# Patient Record
Sex: FEMALE | Race: BLACK OR AFRICAN AMERICAN | Employment: FULL TIME | URBAN - METROPOLITAN AREA
[De-identification: names, ages, dates, MRNs, and addresses within clinical notes are randomized per-mention and may not be internally consistent; named-entity substitution may affect disease eponyms.]

---

## 2017-07-31 PROBLEM — M54.32 BACK PAIN WITH LEFT-SIDED SCIATICA: Status: ACTIVE | Noted: 2017-07-31

## 2017-07-31 PROBLEM — E11.42 TYPE 2 DIABETES MELLITUS WITH DIABETIC POLYNEUROPATHY, WITHOUT LONG-TERM CURRENT USE OF INSULIN (HCC): Status: ACTIVE | Noted: 2017-07-31

## 2017-07-31 PROBLEM — E13.319 RETINOPATHY DUE TO SECONDARY DIABETES MELLITUS (HCC): Status: ACTIVE | Noted: 2017-07-31

## 2017-07-31 PROBLEM — Z87.898 HISTORY OF PALPITATIONS: Status: ACTIVE | Noted: 2017-07-31

## 2018-05-16 ENCOUNTER — HOSPITAL ENCOUNTER (OUTPATIENT)
Dept: GENERAL RADIOLOGY | Age: 43
Discharge: HOME | End: 2018-05-18
Payer: COMMERCIAL

## 2018-05-16 ENCOUNTER — TELEPHONE (OUTPATIENT)
Dept: INTERNAL MEDICINE | Age: 43
End: 2018-05-16

## 2018-05-16 DIAGNOSIS — N64.4 BILATERAL MASTODYNIA: ICD-10-CM

## 2018-05-16 PROCEDURE — G0279 TOMOSYNTHESIS, MAMMO: HCPCS

## 2018-06-05 ENCOUNTER — OFFICE VISIT (OUTPATIENT)
Dept: INTERNAL MEDICINE | Age: 43
End: 2018-06-05
Payer: COMMERCIAL

## 2018-06-05 VITALS
WEIGHT: 140.6 LBS | BODY MASS INDEX: 27.61 KG/M2 | RESPIRATION RATE: 16 BRPM | HEART RATE: 77 BPM | TEMPERATURE: 98.4 F | SYSTOLIC BLOOD PRESSURE: 129 MMHG | DIASTOLIC BLOOD PRESSURE: 76 MMHG | HEIGHT: 60 IN

## 2018-06-05 DIAGNOSIS — E78.5 HYPERLIPIDEMIA, UNSPECIFIED HYPERLIPIDEMIA TYPE: ICD-10-CM

## 2018-06-05 DIAGNOSIS — E11.42 TYPE 2 DIABETES MELLITUS WITH DIABETIC POLYNEUROPATHY, WITHOUT LONG-TERM CURRENT USE OF INSULIN (HCC): ICD-10-CM

## 2018-06-05 DIAGNOSIS — Z79.4 TYPE 2 DIABETES MELLITUS WITHOUT COMPLICATION, WITH LONG-TERM CURRENT USE OF INSULIN (HCC): ICD-10-CM

## 2018-06-05 DIAGNOSIS — E11.9 TYPE 2 DIABETES MELLITUS WITHOUT COMPLICATION, WITH LONG-TERM CURRENT USE OF INSULIN (HCC): ICD-10-CM

## 2018-06-05 LAB — HBA1C MFR BLD: 8.9 %

## 2018-06-05 PROCEDURE — 99212 OFFICE O/P EST SF 10 MIN: CPT | Performed by: INTERNAL MEDICINE

## 2018-06-05 PROCEDURE — 99213 OFFICE O/P EST LOW 20 MIN: CPT | Performed by: INTERNAL MEDICINE

## 2018-06-05 PROCEDURE — 83036 HEMOGLOBIN GLYCOSYLATED A1C: CPT | Performed by: INTERNAL MEDICINE

## 2018-06-05 RX ORDER — LANCETS 30 GAUGE
EACH MISCELLANEOUS
Qty: 180 EACH | Refills: 1 | Status: SHIPPED | OUTPATIENT
Start: 2018-06-05 | End: 2018-08-14 | Stop reason: SDUPTHER

## 2018-06-05 RX ORDER — GLUCOSAMINE HCL/CHONDROITIN SU 500-400 MG
CAPSULE ORAL
Qty: 180 STRIP | Refills: 1 | Status: SHIPPED | OUTPATIENT
Start: 2018-06-05 | End: 2018-08-14 | Stop reason: SDUPTHER

## 2018-06-05 RX ORDER — PRAVASTATIN SODIUM 40 MG
40 TABLET ORAL DAILY
Qty: 90 TABLET | Refills: 0 | Status: SHIPPED | OUTPATIENT
Start: 2018-06-05 | End: 2018-08-14 | Stop reason: SDUPTHER

## 2018-06-05 ASSESSMENT — ENCOUNTER SYMPTOMS
WHEEZING: 0
VOMITING: 0
SHORTNESS OF BREATH: 0
COUGH: 0
BLURRED VISION: 0
ABDOMINAL PAIN: 0
DIARRHEA: 0
HEARTBURN: 0
EYE DISCHARGE: 0
NAUSEA: 0
HEMOPTYSIS: 0
SORE THROAT: 0
CONSTIPATION: 0

## 2018-08-13 ENCOUNTER — TELEPHONE (OUTPATIENT)
Dept: INTERNAL MEDICINE | Age: 43
End: 2018-08-13

## 2018-08-14 ENCOUNTER — OFFICE VISIT (OUTPATIENT)
Dept: INTERNAL MEDICINE | Age: 43
End: 2018-08-14
Payer: COMMERCIAL

## 2018-08-14 ENCOUNTER — HOSPITAL ENCOUNTER (OUTPATIENT)
Age: 43
Discharge: HOME | End: 2018-08-16
Payer: COMMERCIAL

## 2018-08-14 VITALS
WEIGHT: 141.5 LBS | DIASTOLIC BLOOD PRESSURE: 81 MMHG | BODY MASS INDEX: 27.78 KG/M2 | HEART RATE: 78 BPM | TEMPERATURE: 98.3 F | RESPIRATION RATE: 18 BRPM | SYSTOLIC BLOOD PRESSURE: 122 MMHG | HEIGHT: 60 IN

## 2018-08-14 DIAGNOSIS — E11.9 TYPE 2 DIABETES MELLITUS WITHOUT COMPLICATION, WITH LONG-TERM CURRENT USE OF INSULIN (HCC): ICD-10-CM

## 2018-08-14 DIAGNOSIS — E78.5 HYPERLIPIDEMIA, UNSPECIFIED HYPERLIPIDEMIA TYPE: ICD-10-CM

## 2018-08-14 DIAGNOSIS — Z79.4 TYPE 2 DIABETES MELLITUS WITHOUT COMPLICATION, WITH LONG-TERM CURRENT USE OF INSULIN (HCC): ICD-10-CM

## 2018-08-14 DIAGNOSIS — E11.42 TYPE 2 DIABETES MELLITUS WITH DIABETIC POLYNEUROPATHY, WITHOUT LONG-TERM CURRENT USE OF INSULIN (HCC): Primary | ICD-10-CM

## 2018-08-14 DIAGNOSIS — M54.32 BACK PAIN WITH LEFT-SIDED SCIATICA: ICD-10-CM

## 2018-08-14 LAB
CHOLESTEROL, TOTAL: 155 MG/DL (ref 0–199)
CREATININE URINE: 130 MG/DL (ref 29–226)
HDLC SERPL-MCNC: 23 MG/DL
LDL CHOLESTEROL CALCULATED: ABNORMAL MG/DL (ref 0–99)
MICROALBUMIN UR-MCNC: 40 MG/L
MICROALBUMIN/CREAT UR-RTO: 30.8 (ref 0–30)
TRIGL SERPL-MCNC: 622 MG/DL (ref 0–149)
VLDLC SERPL CALC-MCNC: ABNORMAL MG/DL

## 2018-08-14 PROCEDURE — 99214 OFFICE O/P EST MOD 30 MIN: CPT | Performed by: INTERNAL MEDICINE

## 2018-08-14 PROCEDURE — 82570 ASSAY OF URINE CREATININE: CPT

## 2018-08-14 PROCEDURE — 99212 OFFICE O/P EST SF 10 MIN: CPT | Performed by: INTERNAL MEDICINE

## 2018-08-14 PROCEDURE — 82044 UR ALBUMIN SEMIQUANTITATIVE: CPT

## 2018-08-14 PROCEDURE — 36415 COLL VENOUS BLD VENIPUNCTURE: CPT | Performed by: INTERNAL MEDICINE

## 2018-08-14 PROCEDURE — 80061 LIPID PANEL: CPT

## 2018-08-14 RX ORDER — PRAVASTATIN SODIUM 40 MG
40 TABLET ORAL DAILY
Qty: 90 TABLET | Refills: 1 | Status: SHIPPED | OUTPATIENT
Start: 2018-08-14 | End: 2018-12-19 | Stop reason: SDUPTHER

## 2018-08-14 RX ORDER — GLUCOSAMINE HCL/CHONDROITIN SU 500-400 MG
CAPSULE ORAL
Qty: 180 STRIP | Refills: 5 | Status: SHIPPED | OUTPATIENT
Start: 2018-08-14 | End: 2018-12-19 | Stop reason: SDUPTHER

## 2018-08-14 RX ORDER — LANCETS 30 GAUGE
EACH MISCELLANEOUS
Qty: 200 EACH | Refills: 3 | Status: CANCELLED | OUTPATIENT
Start: 2018-08-14

## 2018-08-14 RX ORDER — LANCETS 30 GAUGE
EACH MISCELLANEOUS
Qty: 180 EACH | Refills: 5 | Status: SHIPPED | OUTPATIENT
Start: 2018-08-14 | End: 2018-12-19 | Stop reason: SDUPTHER

## 2018-08-14 RX ORDER — NAPROXEN 375 MG/1
375 TABLET ORAL 2 TIMES DAILY WITH MEALS
Qty: 30 TABLET | Refills: 1 | Status: SHIPPED | OUTPATIENT
Start: 2018-08-14 | End: 2018-12-19 | Stop reason: SDUPTHER

## 2018-08-14 ASSESSMENT — ENCOUNTER SYMPTOMS: SHORTNESS OF BREATH: 0

## 2018-08-14 NOTE — PROGRESS NOTES
Pt information obtained with assistance from  phone- Rafael Fisher, [phone]. Lab work done. Discharge instructions reviewed with patient per Dr. Kishan Fan with assistance from Dominic Bridges, [address] [facility] . AVS given to patient.

## 2018-08-14 NOTE — PATIENT INSTRUCTIONS
We tested your blood for cholesterol and kidney function. Please check your sugar before each meal.    Please be compliant with your insulin.

## 2018-08-17 NOTE — PROGRESS NOTES
Met with pt as part of  [facility] and interpretation from [employer] phone # [phone]. Pt is known to LSW; discussed importance of adherence to diabetes regiment. She does keep blood sugar log and applauded for those efforts. Pt states she struggles financially with testing supplies; reminded of being on jeffery program and LSW provided renewal of contract to bring to pharmacy; Dr. Dilcia Leslie signed out samples of Humalog and Lantus and pt verifies she can afford po medications. Attempted to review her insuracne coverage, but she states she doesn't really know what is covered; advised of financial assistance process and need to submit proof of income with application which she verbalized understanding.

## 2018-09-12 ENCOUNTER — OFFICE VISIT (OUTPATIENT)
Dept: INTERNAL MEDICINE | Age: 43
End: 2018-09-12
Payer: COMMERCIAL

## 2018-09-12 VITALS
WEIGHT: 140.3 LBS | HEIGHT: 60 IN | DIASTOLIC BLOOD PRESSURE: 75 MMHG | TEMPERATURE: 97.8 F | SYSTOLIC BLOOD PRESSURE: 127 MMHG | HEART RATE: 66 BPM | BODY MASS INDEX: 27.55 KG/M2

## 2018-09-12 DIAGNOSIS — E11.42 TYPE 2 DIABETES MELLITUS WITH DIABETIC POLYNEUROPATHY, WITHOUT LONG-TERM CURRENT USE OF INSULIN (HCC): Primary | ICD-10-CM

## 2018-09-12 DIAGNOSIS — B35.1 ONYCHOMYCOSIS: ICD-10-CM

## 2018-09-12 PROCEDURE — 99214 OFFICE O/P EST MOD 30 MIN: CPT | Performed by: INTERNAL MEDICINE

## 2018-09-12 PROCEDURE — 99212 OFFICE O/P EST SF 10 MIN: CPT | Performed by: INTERNAL MEDICINE

## 2018-09-12 RX ORDER — TERBINAFINE HYDROCHLORIDE 250 MG/1
250 TABLET ORAL DAILY
Qty: 84 TABLET | Refills: 0 | Status: SHIPPED | OUTPATIENT
Start: 2018-09-12 | End: 2018-12-19 | Stop reason: SDUPTHER

## 2018-09-12 ASSESSMENT — PATIENT HEALTH QUESTIONNAIRE - PHQ9
2. FEELING DOWN, DEPRESSED OR HOPELESS: 0
SUM OF ALL RESPONSES TO PHQ QUESTIONS 1-9: 0
SUM OF ALL RESPONSES TO PHQ9 QUESTIONS 1 & 2: 0
1. LITTLE INTEREST OR PLEASURE IN DOING THINGS: 0
SUM OF ALL RESPONSES TO PHQ QUESTIONS 1-9: 0

## 2018-09-12 ASSESSMENT — ENCOUNTER SYMPTOMS
GASTROINTESTINAL NEGATIVE: 1
EYES NEGATIVE: 1

## 2018-09-12 NOTE — PROGRESS NOTES
Chief Complaint   Patient presents with    Diabetes     brought BS log in with her. History of Present Illness:  40 yo female with history of DM2 presents for follow-up. States she has been taking Lantus 30 units and Humalog 15 units prior to supper. She reports feeling tired when hyperglycemic and headaches when hypoglycemic. Review of Systems   Constitutional: Negative. HENT: Negative. Eyes: Negative. Cardiovascular: Negative. Gastrointestinal: Negative. Genitourinary: Negative. Musculoskeletal: Negative. Skin:        Right great toe fungal infection   Neurological: Negative. Past Medical History:   Diagnosis Date    History of palpitations 7/31/2017     No past surgical history on file. No family history on file. Social History     Social History    Marital status:      Spouse name: N/A    Number of children: N/A    Years of education: N/A     Occupational History    Not on file.      Social History Main Topics    Smoking status: Never Smoker    Smokeless tobacco: Never Used    Alcohol use No    Drug use: No    Sexual activity: Yes     Partners: Male     Other Topics Concern    Not on file     Social History Narrative    No narrative on file     No Known Allergies  Current Outpatient Prescriptions on File Prior to Visit   Medication Sig Dispense Refill    metFORMIN (GLUCOPHAGE) 500 MG tablet Take 1 tablet by mouth 2 times daily (with meals) 180 tablet 1    pravastatin (PRAVACHOL) 40 MG tablet Take 1 tablet by mouth daily 90 tablet 1    insulin glargine (LANTUS SOLOSTAR) 100 UNIT/ML injection pen Inject 30 Units into the skin daily 5 pen 5    Insulin Pen Needle (B-D ULTRAFINE III SHORT PEN) 31G X 8 MM MISC 1 each by Does not apply route 2 times daily 200 each 5    Lancets MISC Agamatrix presto/tests twice daily 180 each 5    blood glucose monitor strips Agamatrix presto/tests twice daily 180 strip 5    insulin lispro (HUMALOG KWIKPEN) 100

## 2018-09-12 NOTE — PATIENT INSTRUCTIONS
Patient Education        Prevención de caídas: Instrucciones de cuidado - [ Preventing Falls: Care Instructions ]  Instrucciones de cuidado    Caminar por cordova casa de manera ly puede convertirse en un desafío, sobre todo si tiene lesiones o problemas de garry que puedan hacer que se caiga con [place]. Las alfombras sueltas y los muebles en los pasillos se encuentran entre los peligros que enfrentan muchas personas mayores que tienen problemas para caminar o de la visión. Las [employer] tienen afecciones peter artritis, osteoporosis o demencia, también deben tener cuidado de no caerse. Usted puede hacer que cordova hogar sea más seguro si christiano algunas medidas sencillas. La atención de seguimiento es stanton parte clave de cordova tratamiento y seguridad. Asegúrese de hacer y acudir a todas las citas, y llame a cordova médico si está teniendo problemas. También es stanton buena idea saber los resultados de haritha exámenes y mantener stanton lista de los medicamentos que christiano. ¿Cómo puede cuidarse en el hogar? Cómo cuidarse  · Podría marearse si no kobe suficiente agua. Para prevenir la deshidratación, rosa abundantes líquidos, los suficientes para que cordova orina sea de color amarillo tami o transparente peter el agua. Elija roselyn agua y otros líquidos preeti sin cafeína. Si tiene stanton enfermedad del riñón, del corazón o del hígado y tiene que Maurice's líquidos, hable con crodova médico antes de aumentar cordova consumo. · Isma ejercicio con regularidad para mejorar cordova fortaleza, cordova srinivas muscular y cordova estabilidad. Camine, si puede. Nadar podría ser [facility] buena alternativa si le marry trabajo caminar. · Hágase un examen de la visión y la audición cada año o cada vez que note un cambio. Si tiene dificultades para oswaldo y oír, es posible que no pueda evitar objetos y podría perder cordova equilibrio. · Conozca los efectos secundarios de los medicamentos que christiano.  Pregúntele a cordova médico o cordova farmacéutico si los medicamentos que christiano pueden afectar cordova equilibrio. Las pastillas para dormir o los sedantes pueden afectar cordova equilibrio. · Limite la cantidad de alcohol que kobe. El alcohol puede alterar cordova equilibrio y otros sentidos. · Pregúntele a cordova médico si los callos o las callosidades deben ser eliminados de haritha pies. Si [place] un calzado holgado a causa de los callos o las callosidades, podría perder el equilibrio y caerse. · Hable con cordova médico si tiene entumecimiento en los pies. [address] [place] en el hogar  · Quite escalones en la francisco javier de entrada, alfombrillas y obstáculos. Fije las alfombras sueltas o repare las áreas levantadas del piso. · Mueva los muebles y los cables eléctricos para que no estén en los pasillos. · Utilice cera antideslizante para pisos, y seque de inmediato cualquier derrame que se produzca, sobre todo si el piso es de baldosas de cerámica. · Si [place] stanton andadera o un bastón, colóquele un revestimiento de goma en las puntas. Si utiliza muletas, limpie la base regularmente con un paño abrasivo, peter un estropajo de berenice. · Mantenga la casa jumana isabelle, sobre todo las [place], los porches y los pasillos exteriores. Utilice lamparitas nocturnas en áreas peter vestíbulos y renard. Ponga interruptores de andres adicionales o utilice interruptores a distancia (peter los que se encienden o apagan al aplaudir) para que sea más fácil encender las luces si tiene que levantarse por las noches. · Instale pasamanos o barandillas sólidos en las escaleras. · Ponga los artículos que más utiliza en los estantes bajos de los gabinetes (aproximadamente a la altura de cordova cintura). · Tenga un teléfono [facility] linterna con baterías nuevas cerca de cordova cama. Si es posible, coloque un teléfono en cada stanton de las habitaciones de cordova casa, o lleve siempre un celular en ant de que se caiga y no pueda llegar al teléfono.  O puede usar un dispositivo en el rodrigue o la brandon en el que presione un botón para enviar stanton señal pidiendo

## 2018-12-19 ENCOUNTER — HOSPITAL ENCOUNTER (OUTPATIENT)
Age: 43
Discharge: HOME | End: 2018-12-19
Payer: COMMERCIAL

## 2018-12-19 ENCOUNTER — OFFICE VISIT (OUTPATIENT)
Dept: INTERNAL MEDICINE | Age: 43
End: 2018-12-19
Payer: COMMERCIAL

## 2018-12-19 VITALS
DIASTOLIC BLOOD PRESSURE: 72 MMHG | RESPIRATION RATE: 14 BRPM | HEIGHT: 60 IN | TEMPERATURE: 98 F | SYSTOLIC BLOOD PRESSURE: 125 MMHG | HEART RATE: 69 BPM | WEIGHT: 137 LBS | BODY MASS INDEX: 26.9 KG/M2

## 2018-12-19 DIAGNOSIS — E78.5 HYPERLIPIDEMIA, UNSPECIFIED HYPERLIPIDEMIA TYPE: ICD-10-CM

## 2018-12-19 DIAGNOSIS — E11.42 TYPE 2 DIABETES MELLITUS WITH DIABETIC POLYNEUROPATHY, WITHOUT LONG-TERM CURRENT USE OF INSULIN (HCC): ICD-10-CM

## 2018-12-19 DIAGNOSIS — B35.1 ONYCHOMYCOSIS OF GREAT TOE: ICD-10-CM

## 2018-12-19 DIAGNOSIS — M54.32 BACK PAIN WITH LEFT-SIDED SCIATICA: ICD-10-CM

## 2018-12-19 DIAGNOSIS — B35.1 ONYCHOMYCOSIS: ICD-10-CM

## 2018-12-19 DIAGNOSIS — E11.9 TYPE 2 DIABETES MELLITUS WITHOUT COMPLICATION, WITH LONG-TERM CURRENT USE OF INSULIN (HCC): ICD-10-CM

## 2018-12-19 DIAGNOSIS — Z79.4 TYPE 2 DIABETES MELLITUS WITHOUT COMPLICATION, WITH LONG-TERM CURRENT USE OF INSULIN (HCC): ICD-10-CM

## 2018-12-19 LAB
ALBUMIN SERPL-MCNC: 4 G/DL (ref 3.5–5.2)
ALP BLD-CCNC: 89 U/L (ref 35–104)
ALT SERPL-CCNC: 19 U/L (ref 0–32)
ANION GAP SERPL CALCULATED.3IONS-SCNC: 13 MMOL/L (ref 7–16)
AST SERPL-CCNC: 20 U/L (ref 0–31)
BILIRUB SERPL-MCNC: 0.3 MG/DL (ref 0–1.2)
BUN BLDV-MCNC: 8 MG/DL (ref 6–20)
CALCIUM SERPL-MCNC: 9 MG/DL (ref 8.6–10.2)
CHLORIDE BLD-SCNC: 101 MMOL/L (ref 98–107)
CHOLESTEROL, TOTAL: 153 MG/DL (ref 0–199)
CO2: 26 MMOL/L (ref 22–29)
CREAT SERPL-MCNC: 0.5 MG/DL (ref 0.5–1)
GFR AFRICAN AMERICAN: >60
GFR NON-AFRICAN AMERICAN: >60 ML/MIN/1.73
GLUCOSE BLD-MCNC: 162 MG/DL (ref 74–99)
HDLC SERPL-MCNC: 42 MG/DL
LDL CHOLESTEROL CALCULATED: 71 MG/DL (ref 0–99)
POTASSIUM SERPL-SCNC: 4.1 MMOL/L (ref 3.5–5)
SODIUM BLD-SCNC: 140 MMOL/L (ref 132–146)
TOTAL PROTEIN: 7.8 G/DL (ref 6.4–8.3)
TRIGL SERPL-MCNC: 200 MG/DL (ref 0–149)
VLDLC SERPL CALC-MCNC: 40 MG/DL

## 2018-12-19 PROCEDURE — 36415 COLL VENOUS BLD VENIPUNCTURE: CPT

## 2018-12-19 PROCEDURE — 80061 LIPID PANEL: CPT

## 2018-12-19 PROCEDURE — 99212 OFFICE O/P EST SF 10 MIN: CPT | Performed by: INTERNAL MEDICINE

## 2018-12-19 PROCEDURE — 99214 OFFICE O/P EST MOD 30 MIN: CPT | Performed by: INTERNAL MEDICINE

## 2018-12-19 PROCEDURE — 80053 COMPREHEN METABOLIC PANEL: CPT

## 2018-12-19 RX ORDER — GLUCOSAMINE HCL/CHONDROITIN SU 500-400 MG
CAPSULE ORAL
Qty: 180 STRIP | Refills: 1 | Status: SHIPPED | OUTPATIENT
Start: 2018-12-19 | End: 2019-02-20 | Stop reason: SDUPTHER

## 2018-12-19 RX ORDER — TERBINAFINE HYDROCHLORIDE 250 MG/1
250 TABLET ORAL DAILY
Qty: 84 TABLET | Refills: 0 | Status: SHIPPED | OUTPATIENT
Start: 2018-12-19 | End: 2019-03-13

## 2018-12-19 RX ORDER — PRAVASTATIN SODIUM 40 MG
40 TABLET ORAL DAILY
Qty: 30 TABLET | Refills: 3 | Status: SHIPPED | OUTPATIENT
Start: 2018-12-19 | End: 2019-02-20 | Stop reason: SDUPTHER

## 2018-12-19 RX ORDER — LANCETS 30 GAUGE
EACH MISCELLANEOUS
Qty: 180 EACH | Refills: 1 | Status: SHIPPED | OUTPATIENT
Start: 2018-12-19 | End: 2019-02-20 | Stop reason: SDUPTHER

## 2018-12-19 RX ORDER — NAPROXEN 375 MG/1
375 TABLET ORAL 2 TIMES DAILY PRN
Qty: 30 TABLET | Refills: 3 | Status: SHIPPED | OUTPATIENT
Start: 2018-12-19 | End: 2019-02-20 | Stop reason: SDUPTHER

## 2018-12-19 NOTE — PATIENT INSTRUCTIONS
Continue to monitor BS levels twice daily  MEDICATION CHANGES:  LANTUS increased to 40 units daily  LISPRO increased 12 units daily    Stick to low carbohydrate diet   Do labs prior to next visit:  CMP and lipid panel

## 2019-02-20 ENCOUNTER — OFFICE VISIT (OUTPATIENT)
Dept: INTERNAL MEDICINE | Age: 44
End: 2019-02-20
Payer: COMMERCIAL

## 2019-02-20 VITALS
SYSTOLIC BLOOD PRESSURE: 137 MMHG | HEART RATE: 78 BPM | BODY MASS INDEX: 28.07 KG/M2 | RESPIRATION RATE: 16 BRPM | DIASTOLIC BLOOD PRESSURE: 82 MMHG | HEIGHT: 60 IN | TEMPERATURE: 98 F | WEIGHT: 143 LBS

## 2019-02-20 DIAGNOSIS — Z79.4 TYPE 2 DIABETES MELLITUS WITHOUT COMPLICATION, WITH LONG-TERM CURRENT USE OF INSULIN (HCC): Primary | ICD-10-CM

## 2019-02-20 DIAGNOSIS — M54.32 BACK PAIN WITH LEFT-SIDED SCIATICA: ICD-10-CM

## 2019-02-20 DIAGNOSIS — M75.41 IMPINGEMENT SYNDROME OF RIGHT SHOULDER: ICD-10-CM

## 2019-02-20 DIAGNOSIS — E78.5 HYPERLIPIDEMIA, UNSPECIFIED HYPERLIPIDEMIA TYPE: ICD-10-CM

## 2019-02-20 DIAGNOSIS — E11.42 TYPE 2 DIABETES MELLITUS WITH DIABETIC POLYNEUROPATHY, WITHOUT LONG-TERM CURRENT USE OF INSULIN (HCC): ICD-10-CM

## 2019-02-20 DIAGNOSIS — Z00.00 HEALTHCARE MAINTENANCE: ICD-10-CM

## 2019-02-20 DIAGNOSIS — E11.9 TYPE 2 DIABETES MELLITUS WITHOUT COMPLICATION, WITH LONG-TERM CURRENT USE OF INSULIN (HCC): Primary | ICD-10-CM

## 2019-02-20 DIAGNOSIS — Z23 NEED FOR INFLUENZA VACCINATION: ICD-10-CM

## 2019-02-20 PROCEDURE — 99212 OFFICE O/P EST SF 10 MIN: CPT | Performed by: INTERNAL MEDICINE

## 2019-02-20 PROCEDURE — 99214 OFFICE O/P EST MOD 30 MIN: CPT | Performed by: INTERNAL MEDICINE

## 2019-02-20 RX ORDER — NAPROXEN 500 MG/1
500 TABLET ORAL 2 TIMES DAILY PRN
Qty: 60 TABLET | Refills: 2 | Status: SHIPPED | OUTPATIENT
Start: 2019-02-20 | End: 2019-11-22 | Stop reason: SINTOL

## 2019-02-20 RX ORDER — LANCETS 30 GAUGE
EACH MISCELLANEOUS
Qty: 180 EACH | Refills: 1 | Status: SHIPPED | OUTPATIENT
Start: 2019-02-20 | End: 2019-02-20 | Stop reason: SDUPTHER

## 2019-02-20 RX ORDER — GLUCOSAMINE HCL/CHONDROITIN SU 500-400 MG
CAPSULE ORAL
Qty: 180 STRIP | Refills: 5 | Status: SHIPPED | OUTPATIENT
Start: 2019-02-20 | End: 2019-06-06 | Stop reason: SDUPTHER

## 2019-02-20 RX ORDER — PRAVASTATIN SODIUM 40 MG
40 TABLET ORAL DAILY
Qty: 30 TABLET | Refills: 2 | Status: SHIPPED | OUTPATIENT
Start: 2019-02-20 | End: 2019-06-06 | Stop reason: SDUPTHER

## 2019-02-20 RX ORDER — LANCETS 30 GAUGE
EACH MISCELLANEOUS
Qty: 180 EACH | Refills: 1 | Status: SHIPPED | OUTPATIENT
Start: 2019-02-20 | End: 2019-06-06 | Stop reason: SDUPTHER

## 2019-02-20 RX ORDER — NAPROXEN 500 MG/1
500 TABLET ORAL 2 TIMES DAILY PRN
Qty: 60 TABLET | Refills: 1 | Status: SHIPPED | OUTPATIENT
Start: 2019-02-20 | End: 2019-02-20 | Stop reason: SDUPTHER

## 2019-02-20 ASSESSMENT — ENCOUNTER SYMPTOMS
SORE THROAT: 0
VOMITING: 0
CONSTIPATION: 0
COUGH: 0
EYE DISCHARGE: 0
SHORTNESS OF BREATH: 0
ABDOMINAL PAIN: 0
DIARRHEA: 0
WHEEZING: 0
NAUSEA: 0

## 2019-02-20 ASSESSMENT — PATIENT HEALTH QUESTIONNAIRE - PHQ9
SUM OF ALL RESPONSES TO PHQ QUESTIONS 1-9: 0
1. LITTLE INTEREST OR PLEASURE IN DOING THINGS: 0
2. FEELING DOWN, DEPRESSED OR HOPELESS: 0
SUM OF ALL RESPONSES TO PHQ9 QUESTIONS 1 & 2: 0
SUM OF ALL RESPONSES TO PHQ QUESTIONS 1-9: 0

## 2019-06-06 ENCOUNTER — OFFICE VISIT (OUTPATIENT)
Dept: INTERNAL MEDICINE | Age: 44
End: 2019-06-06
Payer: COMMERCIAL

## 2019-06-06 ENCOUNTER — HOSPITAL ENCOUNTER (OUTPATIENT)
Age: 44
Discharge: HOME | End: 2019-06-06
Payer: COMMERCIAL

## 2019-06-06 VITALS
RESPIRATION RATE: 16 BRPM | BODY MASS INDEX: 28.19 KG/M2 | HEIGHT: 60 IN | WEIGHT: 143.6 LBS | HEART RATE: 81 BPM | DIASTOLIC BLOOD PRESSURE: 72 MMHG | TEMPERATURE: 98.9 F | SYSTOLIC BLOOD PRESSURE: 115 MMHG

## 2019-06-06 DIAGNOSIS — Z11.4 SCREENING FOR HIV (HUMAN IMMUNODEFICIENCY VIRUS): ICD-10-CM

## 2019-06-06 DIAGNOSIS — E78.5 HYPERLIPIDEMIA, UNSPECIFIED HYPERLIPIDEMIA TYPE: ICD-10-CM

## 2019-06-06 DIAGNOSIS — E11.9 TYPE 2 DIABETES MELLITUS WITHOUT COMPLICATION, WITH LONG-TERM CURRENT USE OF INSULIN (HCC): ICD-10-CM

## 2019-06-06 DIAGNOSIS — R10.30 LOWER ABDOMINAL PAIN: Primary | ICD-10-CM

## 2019-06-06 DIAGNOSIS — Z79.4 TYPE 2 DIABETES MELLITUS WITHOUT COMPLICATION, WITH LONG-TERM CURRENT USE OF INSULIN (HCC): ICD-10-CM

## 2019-06-06 LAB
BILIRUBIN, POC: ABNORMAL
BLOOD URINE, POC: ABNORMAL
CLARITY, POC: CLEAR
COLOR, POC: YELLOW
GLUCOSE URINE, POC: ABNORMAL
HBA1C MFR BLD: 7.5 %
KETONES, POC: ABNORMAL
LEUKOCYTE EST, POC: ABNORMAL
NITRITE, POC: ABNORMAL
PH, POC: 5
PROTEIN, POC: ABNORMAL
SPECIFIC GRAVITY, POC: 1.01
UROBILINOGEN, POC: 0.2

## 2019-06-06 PROCEDURE — 86703 HIV-1/HIV-2 1 RESULT ANTBDY: CPT

## 2019-06-06 PROCEDURE — 36415 COLL VENOUS BLD VENIPUNCTURE: CPT

## 2019-06-06 PROCEDURE — 99212 OFFICE O/P EST SF 10 MIN: CPT | Performed by: INTERNAL MEDICINE

## 2019-06-06 PROCEDURE — 83036 HEMOGLOBIN GLYCOSYLATED A1C: CPT | Performed by: INTERNAL MEDICINE

## 2019-06-06 PROCEDURE — 81002 URINALYSIS NONAUTO W/O SCOPE: CPT | Performed by: INTERNAL MEDICINE

## 2019-06-06 PROCEDURE — 99214 OFFICE O/P EST MOD 30 MIN: CPT | Performed by: INTERNAL MEDICINE

## 2019-06-06 RX ORDER — PRAVASTATIN SODIUM 40 MG
40 TABLET ORAL DAILY
Qty: 30 TABLET | Refills: 2 | Status: SHIPPED
Start: 2019-06-06 | End: 2020-05-27 | Stop reason: SDUPTHER

## 2019-06-06 RX ORDER — LANCETS 30 GAUGE
EACH MISCELLANEOUS
Qty: 180 EACH | Refills: 2 | Status: SHIPPED | OUTPATIENT
Start: 2019-06-06 | End: 2019-10-04 | Stop reason: SDUPTHER

## 2019-06-06 RX ORDER — GLUCOSAMINE HCL/CHONDROITIN SU 500-400 MG
CAPSULE ORAL
Qty: 180 STRIP | Refills: 2 | Status: SHIPPED | OUTPATIENT
Start: 2019-06-06 | End: 2019-10-04 | Stop reason: SDUPTHER

## 2019-06-06 NOTE — PATIENT INSTRUCTIONS
Thank you for coming to your appointment today. Please make sure to do the following:  - Have imaging done  - Schedule your appointment in 2 weeks  - Continue the medications as listed    If your symptoms worsen or do not improve, call for a sooner appointment or call [phone] for the nurse's line.     Sae Smith MD

## 2019-06-06 NOTE — PROGRESS NOTES
Chamate  [id number] used for soaping this pt   poc urine and a1c completed in clinic and dr informed of results   All instructions give to pt by bobby interpretor chuck   9 lantus pens and 5 humalog pens given to pt per drs order   Fu appt scheduled and avs given
Met with pt thru [employer] video  [id number]; applauded pt for reduction of A1C which she credits to her change of eating habits.   Explained to pt that doctor will sign out insulins and she will pay for pills; also re signed for jeffery as pt reurn will be after other contract renewal.
[facility]  Internal Medicine Residency    Internal Medicine     Attending Physician Statement  I have discussed the case, including pertinent history and exam findings with the resident and the team.  I have seen and examined the patient and the key elements of the encounter have been performed by me. I agree with the assessment, plan and orders as documented by the resident. 41 y.o. female here for a follow-up. Past, family, and social history were reviewed. Blood pressure 115/72, pulse 81, temperature 98.9 °F (37.2 °C), temperature source Oral, resp. rate 16, height 5' (1.524 m), weight 143 lb 9.6 oz (65.1 kg), not currently breastfeeding. AP:  1. DM2- A1c 7: cont metformin, lantus 45 units and humalog 12 units     2. Urinary frequency with lower abdominal pain: check UA    3.  Increased abdominal pain with menses- possible fibroids: U/S    Chiqui Stuart MD
supper. 5 pen 2    insulin glargine (LANTUS SOLOSTAR) 100 UNIT/ML injection pen Inject 45 Units into the skin daily 5 pen 2    Insulin Pen Needle (B-D ULTRAFINE III SHORT PEN) 31G X 8 MM MISC 1 each by Does not apply route 2 times daily 200 each 3    blood glucose test strips (ASCENSIA AUTODISC VI;ONE TOUCH ULTRA TEST VI) strip 1 each by Other route 2 times daily (before meals) 200 strip 3    Blood Glucose Monitoring Suppl DIANA Agamatrix presto/tests twice daily 1 Device 0    glucose monitoring kit (FREESTYLE) monitoring kit Use once. 1 kit 0    naproxen (NAPROSYN) 500 MG tablet Take 1 tablet by mouth 2 times daily as needed for Pain 60 tablet 2    methyl salicylate-menthol (AMAURY MEYERS GREASELESS) 10-15 % CREA Apply topically 3 times daily as needed for Pain 1 Bottle 1     No current facility-administered medications on file prior to visit. OBJECTIVE:    VS: /72 (Site: Right Upper Arm, Position: Sitting, Cuff Size: Medium Adult)   Pulse 81   Temp 98.9 °F (37.2 °C) (Oral)   Resp 16   Ht 5' (1.524 m)   Wt 143 lb 9.6 oz (65.1 kg)   BMI 28.04 kg/m²   General appearance: Alert, Awake, Oriented times 3, no distress  Lungs: Lungs clear to auscultation bilaterally. No rhonchi, crackles or wheezes  Heart: S1 S2  Regular rate and rhythm. No rub, murmur or gallop  Abdomen: Abdomen soft but obese, non-tender. non-distended BS normal. No masses, organomegaly, no guarding rebound or rigidity. Extremities: No edema, Peripheral pulses palpable 2/4    ASSESSMENT/PLAN:  Kimberly Villareal was seen today for diabetes mellitus, medication refill and abdominal pain. Diagnoses and all orders for this visit:    Lower abdominal pain  -     US OB FOLLOW UP TRANSABDOMINAL APPROACH; Future  -     POC Urine with Microscopic    Type 2 diabetes mellitus without complication, with long-term current use of insulin (HCC)  -     POCT glycosylated hemoglobin (Hb A1C)  -     metFORMIN (GLUCOPHAGE) 1000 MG tablet;  Take 1 tablet by

## 2019-06-07 LAB — HIV-1 AND HIV-2 ANTIBODIES: NORMAL

## 2019-06-14 ENCOUNTER — HOSPITAL ENCOUNTER (OUTPATIENT)
Dept: ULTRASOUND IMAGING | Age: 44
Discharge: HOME | End: 2019-06-16
Payer: COMMERCIAL

## 2019-06-14 DIAGNOSIS — R10.30 LOWER ABDOMINAL PAIN: ICD-10-CM

## 2019-06-14 PROCEDURE — 76816 OB US FOLLOW-UP PER FETUS: CPT

## 2019-06-28 ENCOUNTER — OFFICE VISIT (OUTPATIENT)
Dept: OBGYN | Age: 44
End: 2019-06-28
Payer: COMMERCIAL

## 2019-06-28 ENCOUNTER — HOSPITAL ENCOUNTER (OUTPATIENT)
Age: 44
Discharge: HOME | End: 2019-06-30
Payer: COMMERCIAL

## 2019-06-28 ENCOUNTER — OFFICE VISIT (OUTPATIENT)
Dept: INTERNAL MEDICINE | Age: 44
End: 2019-06-28
Payer: COMMERCIAL

## 2019-06-28 VITALS
TEMPERATURE: 97.8 F | BODY MASS INDEX: 27.93 KG/M2 | WEIGHT: 143 LBS | HEART RATE: 68 BPM | DIASTOLIC BLOOD PRESSURE: 75 MMHG | SYSTOLIC BLOOD PRESSURE: 116 MMHG

## 2019-06-28 VITALS
HEART RATE: 68 BPM | RESPIRATION RATE: 12 BRPM | DIASTOLIC BLOOD PRESSURE: 75 MMHG | BODY MASS INDEX: 28.07 KG/M2 | HEIGHT: 60 IN | SYSTOLIC BLOOD PRESSURE: 116 MMHG | TEMPERATURE: 97.8 F | WEIGHT: 143 LBS

## 2019-06-28 DIAGNOSIS — N89.8 VAGINAL DISCHARGE: ICD-10-CM

## 2019-06-28 DIAGNOSIS — R10.30 LOWER ABDOMINAL PAIN: Primary | ICD-10-CM

## 2019-06-28 DIAGNOSIS — Z78.9 NON-ENGLISH SPEAKING PATIENT: ICD-10-CM

## 2019-06-28 DIAGNOSIS — N89.8 VAGINAL DISCHARGE: Primary | ICD-10-CM

## 2019-06-28 DIAGNOSIS — Z12.4 SCREENING FOR MALIGNANT NEOPLASM OF CERVIX: ICD-10-CM

## 2019-06-28 DIAGNOSIS — Z01.419 WOMEN'S ANNUAL ROUTINE GYNECOLOGICAL EXAMINATION: ICD-10-CM

## 2019-06-28 LAB
CLUE CELLS: NORMAL
SOURCE WET PREP: NORMAL
TRICHOMONAS PREP: NORMAL
YEAST WET PREP: NORMAL

## 2019-06-28 PROCEDURE — 87210 SMEAR WET MOUNT SALINE/INK: CPT

## 2019-06-28 PROCEDURE — 99213 OFFICE O/P EST LOW 20 MIN: CPT | Performed by: NURSE PRACTITIONER

## 2019-06-28 PROCEDURE — 87591 N.GONORRHOEAE DNA AMP PROB: CPT

## 2019-06-28 PROCEDURE — G0123 SCREEN CERV/VAG THIN LAYER: HCPCS

## 2019-06-28 PROCEDURE — 99213 OFFICE O/P EST LOW 20 MIN: CPT | Performed by: INTERNAL MEDICINE

## 2019-06-28 PROCEDURE — 87624 HPV HI-RISK TYP POOLED RSLT: CPT

## 2019-06-28 PROCEDURE — 87491 CHLMYD TRACH DNA AMP PROBE: CPT

## 2019-06-28 PROCEDURE — 99212 OFFICE O/P EST SF 10 MIN: CPT | Performed by: INTERNAL MEDICINE

## 2019-06-28 ASSESSMENT — ENCOUNTER SYMPTOMS
GASTROINTESTINAL NEGATIVE: 1
RESPIRATORY NEGATIVE: 1

## 2019-06-28 NOTE — PROGRESS NOTES
[facility]  Internal Medicine Residency Program  [facility] Note      SUBJECTIVE:  CC: had concerns including Results (2 week follow up); Abdominal Pain; and Leg Pain (left). HPI:Collette Huerta presented to the [facility] for a routine visit  Follow up for lower abdominal pain, unrelated to period but resembles period pain. No GI symptoms, chronic constipation. Large white mass came from vagina 2 weeks ago, has vaginal discharge white, no itching. LMP May 19th. (40 days ago) regular periods  - UA negative for hematuria  - for B-HCG  - OBGyn referral same day scheduled for pelvic exam    Review Of Systems:  General: no fevers, chills, weight loss or gain.    Ears/Nose/Throat: no hearing loss, tinnitus, vertigo, nosebleed, nasal congestion, rhinorrhea, sore throat  Respiratory: no cough, pleuritic chest pain, dyspnea, or wheezing  Cardiovascular: no chest pain, angina, dyspnea on exertion, orthopnea, PND, palpitations, or claudication  Gastrointestinal: no nausea, vomiting, heartburn, diarrhea, constipation, abdominal pain, hematochezia or melena  Genitourinary: no urinary urgency, frequency, dysuria, nocturia, hesitancy, or incontinence, pelvic pain +  Musculoskeletal: no arthritis, arthralgia, myalgia, weakness, or morning stiffness  Skin: no abnormal pigmentation, rash, itching, masses, hair or nail changes    Current Outpatient Medications on File Prior to Visit   Medication Sig Dispense Refill    metFORMIN (GLUCOPHAGE) 1000 MG tablet Take 1 tablet by mouth 2 times daily (with meals) 60 tablet 2    pravastatin (PRAVACHOL) 40 MG tablet Take 1 tablet by mouth daily 30 tablet 2    insulin lispro (HUMALOG KWIKPEN) 100 UNIT/ML pen 12 units before dinner   5 sample pens given to pt per drs order  Expires 11/01/2020 lot number [id number] 5 pen 0    insulin glargine (LANTUS SOLOSTAR) 100 UNIT/ML injection pen Inject 45 Units into the skin daily 5 pen 2    Insulin Pen Needle (B-D ULTRAFINE III SHORT PEN) 31G X 8 MM MISC 1 each by Does not apply route 2 times daily 200 each 2    blood glucose monitor strips Agamatrix presto/tests twice daily 180 strip 2    Lancets MISC Agamatrix presto/tests twice daily 180 each 2    insulin glargine (LANTUS SOLOSTAR) 100 UNIT/ML injection pen 9 sample lantus pens given to pt  [id number] expires 05/31/2021 9 pen 0    insulin lispro (HUMALOG KWIKPEN) 100 UNIT/ML pen Inject 12 Units into the skin daily Use 12 units of Humalog with supper. 5 pen 2    naproxen (NAPROSYN) 500 MG tablet Take 1 tablet by mouth 2 times daily as needed for Pain 60 tablet 2    Insulin Pen Needle (B-D ULTRAFINE III SHORT PEN) 31G X 8 MM MISC 1 each by Does not apply route 2 times daily 200 each 3    blood glucose test strips (ASCENSIA AUTODISC VI;ONE TOUCH ULTRA TEST VI) strip 1 each by Other route 2 times daily (before meals) 200 strip 3    methyl salicylate-menthol (AMAURY MEYERS GREASELESS) 10-15 % CREA Apply topically 3 times daily as needed for Pain 1 Bottle 1    Blood Glucose Monitoring Suppl DIANA Agamatrix presto/tests twice daily 1 Device 0    glucose monitoring kit (FREESTYLE) monitoring kit Use once. 1 kit 0     No current facility-administered medications on file prior to visit. OBJECTIVE:    VS: /75   Pulse 68   Temp 97.8 °F (36.6 °C) (Oral)   Resp 12   Ht 5' (1.524 m)   Wt 143 lb (64.9 kg)   BMI 27.93 kg/m²   General appearance: Alert, Awake, Oriented times 3, no distress  Lungs: Lungs clear to auscultation bilaterally. No rhonchi, crackles or wheezes  Heart: S1 S2  Regular rate and rhythm. No rub, murmur or gallop  Abdomen: Abdomen soft but obese, non-tender. non-distended BS normal. No masses, organomegaly, no guarding rebound or rigidity. Extremities: No edema, Peripheral pulses palpable 2/4    ASSESSMENT/PLAN:  Danielle Clemons was seen today for results, abdominal pain and leg pain.     Diagnoses and all orders for this visit:    Lower abdominal pain    pelvic exam  OBGyn referral  US transabdominal unremarkable  HCG    RTC: 2 months    I have reviewed my findings and recommendations with Justine Riddle and Dr Josue James MD PGY-2  6/28/2019 11:20 AM

## 2019-06-28 NOTE — PATIENT INSTRUCTIONS
Patient Education        Preventing Falls: Care Instructions  Your Care Instructions    Getting around your home safely can be a challenge if you have injuries or health problems that make it easy for you to fall. Loose rugs and furniture in walkways are among the dangers for many older people who have problems walking or who have poor eyesight. People who have conditions such as arthritis, osteoporosis, or dementia also have to be careful not to fall. You can make your home safer with a few simple measures. Follow-up care is a key part of your treatment and safety. Be sure to make and go to all appointments, and call your doctor if you are having problems. It's also a good idea to know your test results and keep a list of the medicines you take. How can you care for yourself at home? Taking care of yourself  · You may get dizzy if you do not drink enough water. To prevent dehydration, drink plenty of fluids, enough so that your urine is light yellow or clear like water. Choose water and other caffeine-free clear liquids. If you have kidney, heart, or liver disease and have to limit fluids, talk with your doctor before you increase the amount of fluids you drink. · Exercise regularly to improve your strength, muscle tone, and balance. Walk if you can. Swimming may be a good choice if you cannot walk easily. · Have your vision and hearing checked each year or any time you notice a change. If you have trouble seeing and hearing, you might not be able to avoid objects and could lose your balance. · Know the side effects of the medicines you take. Ask your doctor or pharmacist whether the medicines you take can affect your balance. Sleeping pills or sedatives can affect your balance. · Limit the amount of alcohol you drink. Alcohol can impair your balance and other senses. · Ask your doctor whether calluses or corns on your feet need to be removed.  If you wear loose-fitting shoes because of calluses or corns, you can lose your balance and fall. · Talk to your doctor if you have numbness in your feet. Preventing falls at home  · Remove raised doorway thresholds, throw rugs, and clutter. Repair loose carpet or raised areas in the floor. · Move furniture and electrical cords to keep them out of walking paths. · Use nonskid floor wax, and wipe up spills right away, especially on ceramic tile floors. · If you use a walker or cane, put rubber tips on it. If you use crutches, clean the bottoms of them regularly with an abrasive pad, such as steel wool. · Keep your house well lit, especially [place], and outside walkways. Use night-lights in areas such as hallways and bathrooms. Add extra light switches or use remote switches (such as switches that go on or off when you clap your hands) to make it easier to turn lights on if you have to get up during the night. · Install sturdy handrails on stairways. · Move items in your cabinets so that the things you use a lot are on the lower shelves (about waist level). · Keep a cordless phone and a flashlight with new batteries by your bed. If possible, put a phone in each of the main rooms of your house, or carry a cell phone in case you fall and cannot reach a phone. Or, you can wear a device around your neck or wrist. You push a button that sends a signal for help. · Wear low-heeled shoes that fit well and give your feet good support. Use footwear with nonskid soles. Check the heels and soles of your shoes for wear. Repair or replace worn heels or soles. · Do not wear socks without shoes on wood floors. · Walk on the grass when the sidewalks are slippery. If you live in an area that gets snow and ice in the winter, sprinkle salt on slippery steps and sidewalks. Preventing falls in the bath  · Install grab bars and nonskid mats inside and outside your shower or tub and near the toilet and sinks. · Use shower chairs and bath benches.   · Use a hand-held shower head that will allow you to sit while showering. · Get into a tub or shower by putting the weaker leg in first. Get out of a tub or shower with your strong side first.  · Repair loose toilet seats and consider installing a raised toilet seat to make getting on and off the toilet easier. · Keep your bathroom door unlocked while you are in the shower. Where can you learn more? Go to https://LuxVue TechnologypeYahoo!.Pinwine.cn. org and sign in to your [employer] account. Enter [phone] in the Emergency CallWorks box to learn more about \"Preventing Falls: Care Instructions. \"     If you do not have an account, please click on the \"Sign Up Now\" link. Current as of: November 7, 2018  Content Version: 12.0  © 4623-2250 Healthwise, Incorporated. Care instructions adapted under license by [facility] ([facility]). If you have questions about a medical condition or this instruction, always ask your healthcare professional. [place] [address] any warranty or liability for your use of this information. Thank you for coming to your appointment today. Please make sure to do the following:  - go to OBGyn clinic  - Schedule your appointment in 2 months  - Continue the medications as listed    Referrals have been made to OBGyn: If you do not hear from the office in 1 week, please call the number listed. If your symptoms worsen or do not improve, call for a sooner appointment or call [phone] for the nurse's line.     Sae Smith MD

## 2019-07-05 LAB
CHLAMYDIA BY THIN PREP: NEGATIVE
HPV SAMPLE: NORMAL
HPV TYPE 16: NOT DETECTED
HPV TYPE 18: NOT DETECTED
HPV, HIGH RISK OTHER: NOT DETECTED
INTERPRETATION: NORMAL
N. GONORRHOEAE DNA, THIN PREP: NEGATIVE
SOURCE: NORMAL
SOURCE: NORMAL

## 2019-07-08 NOTE — PROGRESS NOTES
Assisted with pelvic exam, pap smear obtained, labeled @ and hand delivered to lab.  [place] language line used for entire visit # [id number]. US appointment given to patient. Discharge instructions given by manuela burch cnp. One specimen for wet prep sent to lab stat in red bag with sticker.
Lab did not release original order
US Pelvis Complete           Plan:     I spent 40 minutes and 16 minutes of direct contact time with the patient of which greater than 50% of the time was to discuss complications and problems related to her visit today. Performed speculum examination and wet prep, pap smear,oelvic u/s  results pending. Recommended patient to follow up in 4 weeks. Patient instructed if she experiences any new or worsening complaints call office , otherwise will call with results and advise patient for further treatment. All questions and concerns addressed. Patient voices understanding of plan of care. [employer] [place] interpretor #[id number] used for entire appointment.           Leslie Vega, APRN - CNP

## 2019-07-10 ENCOUNTER — HOSPITAL ENCOUNTER (OUTPATIENT)
Dept: ULTRASOUND IMAGING | Age: 44
Discharge: HOME | End: 2019-07-12
Payer: COMMERCIAL

## 2019-07-10 DIAGNOSIS — N89.8 VAGINAL DISCHARGE: ICD-10-CM

## 2019-07-10 DIAGNOSIS — Z01.419 WOMEN'S ANNUAL ROUTINE GYNECOLOGICAL EXAMINATION: ICD-10-CM

## 2019-07-10 DIAGNOSIS — Z12.4 SCREENING FOR MALIGNANT NEOPLASM OF CERVIX: ICD-10-CM

## 2019-07-10 PROCEDURE — 76856 US EXAM PELVIC COMPLETE: CPT

## 2019-07-10 PROCEDURE — 76830 TRANSVAGINAL US NON-OB: CPT

## 2019-07-25 ENCOUNTER — OFFICE VISIT (OUTPATIENT)
Dept: OBGYN | Age: 44
End: 2019-07-25
Payer: COMMERCIAL

## 2019-07-25 VITALS
RESPIRATION RATE: 18 BRPM | TEMPERATURE: 97.2 F | DIASTOLIC BLOOD PRESSURE: 81 MMHG | HEART RATE: 74 BPM | SYSTOLIC BLOOD PRESSURE: 120 MMHG

## 2019-07-25 DIAGNOSIS — Z09 FOLLOW UP: Primary | ICD-10-CM

## 2019-07-25 PROCEDURE — 99212 OFFICE O/P EST SF 10 MIN: CPT | Performed by: NURSE PRACTITIONER

## 2019-07-25 ASSESSMENT — ENCOUNTER SYMPTOMS
GASTROINTESTINAL NEGATIVE: 1
RESPIRATORY NEGATIVE: 1

## 2019-07-25 NOTE — PROGRESS NOTES
Pt here to review pelvic ultrasound results. Pt denies any current issues. Entire visit done using [employer]  #[id number]  POC explained to patient and discharge instructions given to patient by Calvin Gatica CNP.  Pt verbalized understanding

## 2019-07-25 NOTE — PROGRESS NOTES
Subjective:      Patient ID: Jonathan Retana is a 41 y.o. female. HPI: Patient being seen for U/S results and follow up on vaginal discharge. LMP: 7/19/2019  Last pap smear: 06/28/2019  negative , no significant findings. HPV: negative  She denies intermenstrual bleeding, pelvic pain, irregular or excessive bleeding, vaginal discharge, itchiness or lesions on external genitalia   Denies abdominal pain, N/V/D, constipation, change in stool. : Denies frequency, urgency, hesitancy, change in color or odor, hematuria,  or lesions, dyspareunia. Review of Systems   Respiratory: Negative. Cardiovascular: Negative. Gastrointestinal: Negative. Genitourinary: Negative. Objective:   Physical Exam   Constitutional: She is oriented to person, place, and time. She appears well-developed and well-nourished. Cardiovascular: Normal rate and regular rhythm. Pulmonary/Chest: Effort normal and breath sounds normal.   Neurological: She is alert and oriented to person, place, and time. Skin: Skin is warm and dry. Psychiatric: She has a normal mood and affect. Nursing note and vitals reviewed. Assessment:       Diagnosis Orders   1. Follow up             Plan:     Modify [id number] Interpretor used for entire appointment. U/S results reviewed with patient   Recommended patient to follow up if she experiences any new or worsening complaints, ent. The patient was reminded of the importance of safe sexual practices including a mutually monogamous relationship and the use of barrier contraception  She will follow up with her annual visit if no other issues arise. She verbalized understanding and agreement to plan.              MADISYN Rocha - CNP

## 2019-10-01 ENCOUNTER — TELEPHONE (OUTPATIENT)
Dept: INTERNAL MEDICINE | Age: 44
End: 2019-10-01

## 2019-10-01 DIAGNOSIS — E11.9 TYPE 2 DIABETES MELLITUS WITHOUT COMPLICATION, WITH LONG-TERM CURRENT USE OF INSULIN (HCC): Primary | ICD-10-CM

## 2019-10-01 DIAGNOSIS — Z79.4 TYPE 2 DIABETES MELLITUS WITHOUT COMPLICATION, WITH LONG-TERM CURRENT USE OF INSULIN (HCC): Primary | ICD-10-CM

## 2019-10-04 ENCOUNTER — OFFICE VISIT (OUTPATIENT)
Dept: INTERNAL MEDICINE | Age: 44
End: 2019-10-04
Payer: COMMERCIAL

## 2019-10-04 VITALS
WEIGHT: 141.3 LBS | BODY MASS INDEX: 27.74 KG/M2 | SYSTOLIC BLOOD PRESSURE: 158 MMHG | HEART RATE: 70 BPM | TEMPERATURE: 98.6 F | RESPIRATION RATE: 16 BRPM | HEIGHT: 60 IN | DIASTOLIC BLOOD PRESSURE: 81 MMHG

## 2019-10-04 DIAGNOSIS — E11.9 TYPE 2 DIABETES MELLITUS WITHOUT COMPLICATION, WITH LONG-TERM CURRENT USE OF INSULIN (HCC): ICD-10-CM

## 2019-10-04 DIAGNOSIS — R03.0 ELEVATED BP WITHOUT DIAGNOSIS OF HYPERTENSION: Primary | ICD-10-CM

## 2019-10-04 DIAGNOSIS — Z79.4 TYPE 2 DIABETES MELLITUS WITHOUT COMPLICATION, WITH LONG-TERM CURRENT USE OF INSULIN (HCC): ICD-10-CM

## 2019-10-04 PROCEDURE — 90686 IIV4 VACC NO PRSV 0.5 ML IM: CPT

## 2019-10-04 PROCEDURE — 6360000002 HC RX W HCPCS

## 2019-10-04 PROCEDURE — 99214 OFFICE O/P EST MOD 30 MIN: CPT | Performed by: INTERNAL MEDICINE

## 2019-10-04 PROCEDURE — G0008 ADMIN INFLUENZA VIRUS VAC: HCPCS

## 2019-10-04 PROCEDURE — 83036 HEMOGLOBIN GLYCOSYLATED A1C: CPT | Performed by: INTERNAL MEDICINE

## 2019-10-04 RX ORDER — GLUCOSAMINE HCL/CHONDROITIN SU 500-400 MG
CAPSULE ORAL
Qty: 180 STRIP | Refills: 5 | Status: SHIPPED
Start: 2019-10-04 | End: 2020-05-27 | Stop reason: SDUPTHER

## 2019-10-04 RX ORDER — LANCETS 30 GAUGE
EACH MISCELLANEOUS
Qty: 180 EACH | Refills: 5 | Status: SHIPPED
Start: 2019-10-04 | End: 2020-05-27 | Stop reason: SDUPTHER

## 2019-10-04 RX ORDER — GABAPENTIN 100 MG/1
100 CAPSULE ORAL 3 TIMES DAILY
Qty: 90 CAPSULE | Refills: 2 | Status: SHIPPED | OUTPATIENT
Start: 2019-10-04 | End: 2019-10-04

## 2019-10-04 RX ORDER — GABAPENTIN 100 MG/1
100 CAPSULE ORAL 3 TIMES DAILY
Qty: 90 CAPSULE | Refills: 2 | Status: SHIPPED | OUTPATIENT
Start: 2019-10-04 | End: 2019-11-22 | Stop reason: SDUPTHER

## 2019-10-04 ASSESSMENT — ENCOUNTER SYMPTOMS
SORE THROAT: 0
CONSTIPATION: 0
COUGH: 0
EYE DISCHARGE: 0
SINUS PAIN: 0
VOMITING: 0
WHEEZING: 0
APNEA: 0
EYE PAIN: 0
CHOKING: 0
SHORTNESS OF BREATH: 0
SINUS PRESSURE: 0
CHEST TIGHTNESS: 0
ANAL BLEEDING: 0
EYE REDNESS: 0
ABDOMINAL DISTENTION: 0
STRIDOR: 0
ABDOMINAL PAIN: 0
NAUSEA: 0
RHINORRHEA: 0
DIARRHEA: 0
EYE ITCHING: 0

## 2019-11-22 ENCOUNTER — OFFICE VISIT (OUTPATIENT)
Dept: INTERNAL MEDICINE | Age: 44
End: 2019-11-22
Payer: COMMERCIAL

## 2019-11-22 VITALS
HEART RATE: 69 BPM | SYSTOLIC BLOOD PRESSURE: 127 MMHG | TEMPERATURE: 97.9 F | WEIGHT: 144 LBS | BODY MASS INDEX: 28.27 KG/M2 | HEIGHT: 60 IN | DIASTOLIC BLOOD PRESSURE: 83 MMHG | RESPIRATION RATE: 16 BRPM

## 2019-11-22 DIAGNOSIS — E11.42 TYPE 2 DIABETES MELLITUS WITH DIABETIC POLYNEUROPATHY, WITHOUT LONG-TERM CURRENT USE OF INSULIN (HCC): Primary | ICD-10-CM

## 2019-11-22 DIAGNOSIS — R80.9 MICROPROTEINURIA: ICD-10-CM

## 2019-11-22 PROCEDURE — 99212 OFFICE O/P EST SF 10 MIN: CPT | Performed by: INTERNAL MEDICINE

## 2019-11-22 RX ORDER — LISINOPRIL 2.5 MG/1
2.5 TABLET ORAL DAILY
Qty: 30 TABLET | Refills: 2 | Status: SHIPPED | OUTPATIENT
Start: 2019-11-22 | End: 2020-05-27 | Stop reason: SDUPTHER

## 2019-11-22 RX ORDER — INSULIN LISPRO 100 [IU]/ML
INJECTION, SOLUTION INTRAVENOUS; SUBCUTANEOUS
Qty: 5 PEN | Refills: 0 | COMMUNITY
Start: 2019-11-22 | End: 2020-02-22

## 2019-11-22 RX ORDER — BLOOD-GLUCOSE METER
EACH MISCELLANEOUS
Qty: 1 DEVICE | Refills: 0 | Status: SHIPPED | OUTPATIENT
Start: 2019-11-22

## 2019-11-22 RX ORDER — GABAPENTIN 100 MG/1
100 CAPSULE ORAL 3 TIMES DAILY
Qty: 90 CAPSULE | Refills: 2 | Status: SHIPPED | OUTPATIENT
Start: 2019-11-22 | End: 2020-05-27 | Stop reason: SDUPTHER

## 2019-11-22 ASSESSMENT — ENCOUNTER SYMPTOMS
DIARRHEA: 0
COUGH: 0
ABDOMINAL PAIN: 0
SINUS PAIN: 0
VOMITING: 0
NAUSEA: 0
EYE PAIN: 0
SHORTNESS OF BREATH: 0
CONSTIPATION: 0
SORE THROAT: 0

## 2020-02-26 ENCOUNTER — TELEPHONE (OUTPATIENT)
Dept: INTERNAL MEDICINE | Age: 45
End: 2020-02-26

## 2020-02-26 NOTE — TELEPHONE ENCOUNTER
Notified per Lamine Moss that patient is out of her Lantus Insulin. States she is not working and she cannot afford the transportation to this office.  and Dr Dylan Wolfe notified. Will see if patient able to be seen on Medical [facility]. 1 box of Lantus medication to be dropped off to patient today.

## 2020-05-14 ENCOUNTER — HOSPITAL ENCOUNTER (OUTPATIENT)
Age: 45
Discharge: HOME | End: 2020-05-14

## 2020-05-14 LAB
CREATININE URINE: 239 MG/DL (ref 29–226)
HBA1C MFR BLD: 8.9 % (ref 4–5.6)
MICROALBUMIN UR-MCNC: 148.5 MG/L
MICROALBUMIN/CREAT UR-RTO: 62.1 (ref 0–30)

## 2020-05-14 PROCEDURE — 83036 HEMOGLOBIN GLYCOSYLATED A1C: CPT

## 2020-05-14 PROCEDURE — 36415 COLL VENOUS BLD VENIPUNCTURE: CPT

## 2020-05-14 PROCEDURE — 82044 UR ALBUMIN SEMIQUANTITATIVE: CPT

## 2020-05-14 PROCEDURE — 82570 ASSAY OF URINE CREATININE: CPT

## 2020-05-26 ENCOUNTER — TELEPHONE (OUTPATIENT)
Dept: INTERNAL MEDICINE | Age: 45
End: 2020-05-26

## 2020-05-27 ENCOUNTER — OFFICE VISIT (OUTPATIENT)
Dept: INTERNAL MEDICINE | Age: 45
End: 2020-05-27

## 2020-05-27 VITALS
DIASTOLIC BLOOD PRESSURE: 69 MMHG | OXYGEN SATURATION: 98 % | SYSTOLIC BLOOD PRESSURE: 136 MMHG | RESPIRATION RATE: 16 BRPM | WEIGHT: 139 LBS | TEMPERATURE: 98.4 F | HEIGHT: 60 IN | BODY MASS INDEX: 27.29 KG/M2 | HEART RATE: 75 BPM

## 2020-05-27 PROCEDURE — 99214 OFFICE O/P EST MOD 30 MIN: CPT | Performed by: INTERNAL MEDICINE

## 2020-05-27 PROCEDURE — 3052F HG A1C>EQUAL 8.0%<EQUAL 9.0%: CPT | Performed by: INTERNAL MEDICINE

## 2020-05-27 PROCEDURE — 99212 OFFICE O/P EST SF 10 MIN: CPT | Performed by: INTERNAL MEDICINE

## 2020-05-27 RX ORDER — GLUCOSAMINE HCL/CHONDROITIN SU 500-400 MG
CAPSULE ORAL
Qty: 200 STRIP | Refills: 1 | Status: SHIPPED | OUTPATIENT
Start: 2020-05-27

## 2020-05-27 RX ORDER — LANCETS 30 GAUGE
EACH MISCELLANEOUS
Qty: 180 EACH | Refills: 1 | Status: SHIPPED
Start: 2020-05-27 | End: 2020-05-27

## 2020-05-27 RX ORDER — LANCETS 30 GAUGE
EACH MISCELLANEOUS
Qty: 200 EACH | Refills: 1 | Status: SHIPPED
Start: 2020-05-27 | End: 2020-07-29 | Stop reason: SDUPTHER

## 2020-05-27 RX ORDER — GLUCOSAMINE HCL/CHONDROITIN SU 500-400 MG
CAPSULE ORAL
Qty: 180 STRIP | Refills: 1 | Status: SHIPPED
Start: 2020-05-27 | End: 2020-05-27

## 2020-05-27 RX ORDER — INSULIN GLARGINE 100 [IU]/ML
50 INJECTION, SOLUTION SUBCUTANEOUS NIGHTLY
Qty: 10 PEN | Refills: 0 | COMMUNITY
Start: 2020-05-27 | End: 2020-10-23 | Stop reason: ALTCHOICE

## 2020-05-27 RX ORDER — LISINOPRIL 2.5 MG/1
5 TABLET ORAL DAILY
Qty: 90 TABLET | Refills: 0 | Status: SHIPPED
Start: 2020-05-27 | End: 2020-05-27

## 2020-05-27 RX ORDER — PEN NEEDLE, DIABETIC 31 GX5/16"
1 NEEDLE, DISPOSABLE MISCELLANEOUS 2 TIMES DAILY
Qty: 200 EACH | Refills: 1 | Status: SHIPPED
Start: 2020-05-27 | End: 2020-07-29 | Stop reason: SDUPTHER

## 2020-05-27 RX ORDER — PRAVASTATIN SODIUM 40 MG
40 TABLET ORAL DAILY
Qty: 90 TABLET | Refills: 0 | Status: SHIPPED
Start: 2020-05-27 | End: 2020-12-28 | Stop reason: SDUPTHER

## 2020-05-27 RX ORDER — LISINOPRIL 5 MG/1
5 TABLET ORAL DAILY
Qty: 90 TABLET | Refills: 0 | Status: SHIPPED
Start: 2020-05-27 | End: 2020-12-28 | Stop reason: SDUPTHER

## 2020-05-27 RX ORDER — GABAPENTIN 100 MG/1
100 CAPSULE ORAL 3 TIMES DAILY
Qty: 270 CAPSULE | Refills: 0 | Status: SHIPPED | OUTPATIENT
Start: 2020-05-27 | End: 2021-01-26

## 2020-05-27 RX ORDER — LISINOPRIL 2.5 MG/1
5 TABLET ORAL DAILY
Qty: 180 TABLET | Refills: 0 | Status: SHIPPED
Start: 2020-05-27 | End: 2020-05-27

## 2020-05-27 SDOH — ECONOMIC STABILITY: FOOD INSECURITY: WITHIN THE PAST 12 MONTHS, YOU WORRIED THAT YOUR FOOD WOULD RUN OUT BEFORE YOU GOT MONEY TO BUY MORE.: SOMETIMES TRUE

## 2020-05-27 SDOH — ECONOMIC STABILITY: INCOME INSECURITY: HOW HARD IS IT FOR YOU TO PAY FOR THE VERY BASICS LIKE FOOD, HOUSING, MEDICAL CARE, AND HEATING?: VERY HARD

## 2020-05-27 SDOH — ECONOMIC STABILITY: FOOD INSECURITY: WITHIN THE PAST 12 MONTHS, THE FOOD YOU BOUGHT JUST DIDN'T LAST AND YOU DIDN'T HAVE MONEY TO GET MORE.: NEVER TRUE

## 2020-05-27 ASSESSMENT — ENCOUNTER SYMPTOMS
CONSTIPATION: 0
PHOTOPHOBIA: 0
WHEEZING: 0
STRIDOR: 0
DIARRHEA: 0
SORE THROAT: 0
BACK PAIN: 0
COUGH: 0
SINUS PRESSURE: 0
SHORTNESS OF BREATH: 0
CHEST TIGHTNESS: 0

## 2020-05-27 ASSESSMENT — PATIENT HEALTH QUESTIONNAIRE - PHQ9
SUM OF ALL RESPONSES TO PHQ QUESTIONS 1-9: 0
2. FEELING DOWN, DEPRESSED OR HOPELESS: 0
SUM OF ALL RESPONSES TO PHQ9 QUESTIONS 1 & 2: 0
1. LITTLE INTEREST OR PLEASURE IN DOING THINGS: 0
SUM OF ALL RESPONSES TO PHQ QUESTIONS 1-9: 0

## 2020-05-27 NOTE — PROGRESS NOTES
Due to language barrier, an , STAN Mancia was present during the history-taking of patient. Discharge instructions reviewed with patient with assistance from Sourav Mancia. Patient verbalizes understanding. Pt given AVS.     STAN Mancia notified patient of US appt on 6-9-2020 at 1100 AM. Pt also notified of need for fasting Lipid panel that was also ordered.

## 2020-05-27 NOTE — PROGRESS NOTES
Met with pt during visit and [employer] phone #[phone] Pt is known to LSW. Referral for SDOH related to positive financial screen (very hard) and food insecurity ((sometimes true/ never true) States she had not come for other appts due to no transportation  and fear of COVID. Pt last seen november 2019. She continues to reside in house with her brother and significant other. Pt has not worked since December 2019 due to work permit issue. Reports her significant other has not worked for past month and that her brother works, but his money is used for food, utility bills etc.  Pt states received rent assistance from Yanira Vasquez at [facility] ; she is familiar with food pantry in [place], but doesn't like processed foods; hopeful to have garden. Pt is most concerned of not having rent money; suggested she speak with Sr. Yoli Reyes. Pt given [employer] gift card from [address] [place] [address] emergency program.  LSW completed renewal for diabetes supply jeffery contract (testing times same of original contract ) with approval from [employer] of diabetes education. Form faxed to diabetes education, pharmacy, and copy given to pt. [facility] emergency jeffery fund used to cover 90 supply of her po meds as there was only few dollar difference for #30 and #90 and voucher faxed to pharmacy and contract and voucher scannned in record. Pt was also give 2 boxes of basaglar insulin signed out by Dr. Elizabeth Calixto.  Pt to return 6-10-20 to IM and ultrasound scan to be scheduled at [facility] per pt preference. LSW offered to utilize [employer] for [address], but she chooses to have significant bring her. Pt appreciative of assistance and impressed importance of adherence to appts and follow up.

## 2020-05-27 NOTE — PATIENT INSTRUCTIONS
de ______ (por ejemplo, por encima de 250), siga estos pasos:  · Si se saltó stanton dosis de varela medicamento para la diabetes, tómelo ahora. Ingenio solo la cantidad de medicamento que le hayan recetado. No tome ni más ni menos medicamento. · Aplíquese insulina si el médico se la ha recetado para los niveles altos de azúcar en la lori. · Analícese las cetonas, si el médico le indicó que lo indu. Si los [employer] del análisis de cetonas muestran stanton cantidad de moderada a les de cetonas, llame al médico para pedirle consejo. · Espere 30 minutos después de administrarse insulina adicional o de roselyn los medicamentos que no había tomado. Vuelva a controlar varela nivel de azúcar en la lori. Si haritha síntomas o niveles de azúcar en la lori empeoran o no becerra lia después de seguir estos pasos, busque atención médica de inmediato. La atención de seguimiento es stanton parte clave de varela tratamiento y seguridad. Asegúrese de hacer y acudir a todas las citas, y llame a varela médico si está teniendo problemas. También es stanton buena idea saber los resultados de haritha exámenes y mantener stanton lista de los medicamentos que christiano. ¿Dónde puede encontrar más información en inglés? Jamaal Moody a [email or web address] e ingrese a varela cuenta de MyChart. Petra Gill K344 en el Cammie Christy \"Search Health Information\" para más información (en inglés) sobre \"Urgencias diabéticas causadas por los niveles de azúcar en la lori: Varela plan de acción. \"     Si no tiene stanton cuenta, indu clic en el enlace \"Sign Up Now\". Revisado: 20 jiembnate, 2838               AMY del contenido: 12.5  © 7885-9427 Healthwise, [employer]. Las instrucciones de cuidado fueron adaptadas bajo licencia por [facility] ([facility]). Si usted tiene [place] afección médica o sobre estas instrucciones, siempre pregunte a varela profesional de garry. [employer], [employer] niega toda garantía o responsabilidad por varela uso de esta información.

## 2020-06-09 ENCOUNTER — HOSPITAL ENCOUNTER (OUTPATIENT)
Age: 45
Discharge: HOME | End: 2020-06-09

## 2020-06-09 LAB
CHOLESTEROL, TOTAL: 133 MG/DL (ref 0–199)
HDLC SERPL-MCNC: 41 MG/DL
LDL CHOLESTEROL CALCULATED: 61 MG/DL (ref 0–99)
TRIGL SERPL-MCNC: 156 MG/DL (ref 0–149)
VLDLC SERPL CALC-MCNC: 31 MG/DL

## 2020-06-09 PROCEDURE — 80061 LIPID PANEL: CPT

## 2020-06-09 PROCEDURE — 36415 COLL VENOUS BLD VENIPUNCTURE: CPT

## 2020-06-11 ENCOUNTER — HOSPITAL ENCOUNTER (OUTPATIENT)
Dept: ULTRASOUND IMAGING | Age: 45
Discharge: HOME | End: 2020-06-13

## 2020-06-11 PROCEDURE — 76999 ECHO EXAMINATION PROCEDURE: CPT

## 2020-06-16 ENCOUNTER — TELEPHONE (OUTPATIENT)
Dept: INTERNAL MEDICINE | Age: 45
End: 2020-06-16

## 2020-06-17 ENCOUNTER — VIRTUAL VISIT (OUTPATIENT)
Dept: INTERNAL MEDICINE | Age: 45
End: 2020-06-17

## 2020-06-17 PROBLEM — E78.5 HYPERLIPIDEMIA: Status: ACTIVE | Noted: 2020-06-17

## 2020-06-17 PROBLEM — R80.9 MICROPROTEINURIA: Status: ACTIVE | Noted: 2020-06-17

## 2020-06-17 PROBLEM — R22.1 MASS OF NECK: Status: ACTIVE | Noted: 2020-06-17

## 2020-06-17 PROCEDURE — 3052F HG A1C>EQUAL 8.0%<EQUAL 9.0%: CPT | Performed by: INTERNAL MEDICINE

## 2020-06-17 PROCEDURE — 99213 OFFICE O/P EST LOW 20 MIN: CPT | Performed by: INTERNAL MEDICINE

## 2020-06-17 NOTE — PATIENT INSTRUCTIONS
Lymphadenitis: Care Instructions  Your Care Instructions  Lymph nodes are small, bean-shaped glands throughout the body. They help the body fight germs and infections. Lymphadenitis is a swelling of a lymph node. It can be caused by an infection or other condition. The infection is most often in a nearby part of the body. A common example is the lumps on both sides of your neck under the jaw that get tender and bigger when you have a cold or sore throat. Sometimes the lymph node itself may be infected. Usually the swollen lymph nodes go back to normal size without a problem. Treatment, if needed, focuses on treating the cause. For example, a bacterial infection may be treated with antibiotics. This should bring the node back to normal size. An infection caused by a virus often goes away on its own. In rare cases, a badly infected node may need to be drained by your doctor. Follow-up care is a key part of your treatment and safety. Be sure to make and go to all appointments, and call your doctor if you are having problems. It's also a good idea to know your test results and keep a list of the medicines you take. How can you care for yourself at home? · Be safe with medicines. ? If your doctor prescribed antibiotics, take them as directed. Do not stop taking them just because you feel better. You need to take the full course of antibiotics. ? Ask your doctor if you can take an over-the-counter pain medicine, such as acetaminophen (Tylenol), ibuprofen (Advil, Motrin), or naproxen (Aleve). Read and follow all instructions on the label. · If you have pain, try a warm compress. Soak a towel or washcloth in warm water. Wring it out, and place it on the affected skin. · Do not squeeze, drain, or puncture a painful lump. Doing this can irritate or inflame the lump, push any existing infection deeper into the skin, or cause severe bleeding. When should you call for help?    Call your doctor now or seek immediate medical care if:  · Your lymph nodes get bigger. · The area becomes red and feels more tender. · You have a fever that does not go away. Watch closely for changes in your health, and be sure to contact your doctor if:  · You do not get better as expected. Where can you learn more? Go to https://[email or web address] org and sign in to your [employer] account. Enter E847 in the BangTango box to learn more about \"Lymphadenitis: Care Instructions. \"     If you do not have an account, please click on the \"Sign Up Now\" link. Current as of: February 11, 2020               Content Version: 12.5  © 8784-6399 Healthwise, Incorporated. Care instructions adapted under license by [facility] ([facility]). If you have questions about a medical condition or this instruction, always ask your healthcare professional. [address] any warranty or liability for your use of this information.

## 2020-06-17 NOTE — PROGRESS NOTES
I discussed the patient with Dr. Dylan Carpio. Patient seen through Telehealth due to global pandemic to minimize exposure to COVID-19 infection. Patient continues to have left sided neck mass x3 months. She had a previous ultrasound of her neck that showed area to be a lymphnode. She did go to the dentist for evaluation of a tooth, but was told it would be approximately $600 to fix. She could not afford this so she has not had it taken care of. Past medical, surgical, family history reviewed. Medications and allergies reviewed. I reviewed patient labs. Assessment/Plan:  1. Left sided Cervical lymphadenopathy  2. Dental Abscess  3. Diabetes Mellitus Type II  4. Hypertension    Discussed with patient the need for surgical consultation and possible biopsy with persistent lymphadenopathy. Patient states that she would like to get the tooth treated before any extensive work up. Will refer to dental clinic here for management of her tooth and see back with repeat US of the area. Discussed assessment and plan with resident. Agree with their assessment and plan. See their note for further details.      Asher Batres, DO
salicylate-menthol (AMAURY MEYERS GREASELESS) 10-15 % CREA, Apply topically 3 times daily as needed for Pain (Patient not taking: Reported on 10/4/2019), Disp: 1 Bottle, Rfl: 1    Blood Glucose Monitoring Suppl Jaiden ORTIZ presto/tests twice daily, Disp: 1 Device, Rfl: 0      ASSESSMENT/PLAN:    Cervical LAD  Could be from dental abscess need I&D  However, based on the timeline of the LAD more than 3 months, malignancy screening should be done, I offered her CT ab/pelvic, CT chest, tumor markers, and GS referral for LN excision right now, pending dental appointment. She refuses due to cost.   She wants plan B, which she will try to get back to her dentist and we will try to get her for mobile van. Meanwhile, we will repeat neck US in 1 month to evaluation resolution or not    T2DM  Fasting glucose: 100-130s mg/dL   Post meal: 125 - 175s mg/dL  Metformin 1 gram bid  Lantus 50 every morning  Pravachol 40 daily  LDL 61 mg/dL    HTN  The patient did not have BP cuff  Advise the patient to check it during grocery visit at [facility], [address]   On lisinopril 5 mg daily     RTC: 6 weeks    I have reviewed my findings and recommendations with Rachel Bautista MD PGY 3  6/17/2020 8:14 AM

## 2020-07-28 ENCOUNTER — TELEPHONE (OUTPATIENT)
Dept: INTERNAL MEDICINE | Age: 45
End: 2020-07-28

## 2020-07-29 ENCOUNTER — OFFICE VISIT (OUTPATIENT)
Dept: INTERNAL MEDICINE | Age: 45
End: 2020-07-29

## 2020-07-29 ENCOUNTER — SOCIAL WORK (OUTPATIENT)
Dept: INTERNAL MEDICINE | Age: 45
End: 2020-07-29

## 2020-07-29 VITALS
RESPIRATION RATE: 16 BRPM | TEMPERATURE: 97.3 F | HEIGHT: 60 IN | SYSTOLIC BLOOD PRESSURE: 128 MMHG | BODY MASS INDEX: 26.7 KG/M2 | HEART RATE: 73 BPM | WEIGHT: 136 LBS | DIASTOLIC BLOOD PRESSURE: 76 MMHG

## 2020-07-29 LAB — HBA1C MFR BLD: 8.3 %

## 2020-07-29 PROCEDURE — 3052F HG A1C>EQUAL 8.0%<EQUAL 9.0%: CPT | Performed by: INTERNAL MEDICINE

## 2020-07-29 PROCEDURE — 83036 HEMOGLOBIN GLYCOSYLATED A1C: CPT | Performed by: INTERNAL MEDICINE

## 2020-07-29 PROCEDURE — 99203 OFFICE O/P NEW LOW 30 MIN: CPT | Performed by: INTERNAL MEDICINE

## 2020-07-29 PROCEDURE — 99212 OFFICE O/P EST SF 10 MIN: CPT | Performed by: INTERNAL MEDICINE

## 2020-07-29 RX ORDER — LANCETS 30 GAUGE
EACH MISCELLANEOUS
Qty: 200 EACH | Refills: 3 | Status: SHIPPED
Start: 2020-07-29 | End: 2020-10-23 | Stop reason: SDUPTHER

## 2020-07-29 RX ORDER — INSULIN GLARGINE 100 [IU]/ML
50 INJECTION, SOLUTION SUBCUTANEOUS NIGHTLY
Qty: 15 PEN | Refills: 0 | COMMUNITY
Start: 2020-07-29 | End: 2020-10-27

## 2020-07-29 RX ORDER — PEN NEEDLE, DIABETIC 31 GX5/16"
1 NEEDLE, DISPOSABLE MISCELLANEOUS 2 TIMES DAILY
Qty: 200 EACH | Refills: 3 | Status: SHIPPED
Start: 2020-07-29 | End: 2020-10-23 | Stop reason: SDUPTHER

## 2020-07-29 RX ORDER — GLUCOSAMINE HCL/CHONDROITIN SU 500-400 MG
CAPSULE ORAL
Qty: 200 STRIP | Refills: 3 | Status: SHIPPED
Start: 2020-07-29 | End: 2020-10-23 | Stop reason: SDUPTHER

## 2020-07-29 NOTE — PROGRESS NOTES
Met with pt thru [facility] , Heather Nino. Completed paperwork for renewal of diabetes supply jeffery contract as pt has not yet started to work and continues to meet criteria and it would be due in August;  Dr. Naldo Cole signed out 3 boxes of Lantus samples which will be 3 month supply.

## 2020-07-29 NOTE — PROGRESS NOTES
Elissa Mcdonnell interpreted visit. Discharge instructions reviewed with patient per Ciaran Caro. Follow up appt scheduled and AVS given to patient.     A1C completed 8.3     Sample lantus 15 pens given to patient see [facility]

## 2020-07-29 NOTE — PROGRESS NOTES
[facility]  Internal Medicine Residency Program  [facility] Note      SUBJECTIVE:  CC: had concerns including Diabetes. HPI:Collettedaren Yanez presented to the [facility]     Patient presents here today for a 6 week follow-up. Was seen at the clinic on 6/17/2020, complaining of L sided cervical lymphadenopathy. US ordered but was not yet done. Discussed this to patient durng previsit planning states that still needs to come in for her insulin which she gets from the clinic. At the clinic visit patient denies any subjective complaints. States that her left cervical mass/node has been stable. Patient states that she first noted it about 6 months ago. Patient is from [place] , her last visit was 5 years ago. She denies any history of TB, or any recent contact with anyone who had prolonged cough, TB, travel outside the [address] [place] [address],[address]. She denies cough, shortness of breath, night sweats, weight loss. She has also seen a dentist for her tooth pain and it was discussed to her that she needed to have a deep teeth cleaning (? Root canal) but would need about $600 for it. Patient was also given antibiotics that she was unable to remember the name she was asked to take it once a day for a week, but patient was unable to complete the antibiotic. Review Of Systems:  General: no fevers, chills, weight loss or gain.    Ears/Nose/Throat: no hearing loss, tinnitus, vertigo, nosebleed, nasal congestion, rhinorrhea, sore throat, + tooth pain , + L sided cervical LN   Respiratory: no cough, pleuritic chest pain, dyspnea, or wheezing  Cardiovascular: no chest pain, angina, dyspnea on exertion, orthopnea, PND, palpitations, or claudication  Gastrointestinal: no nausea, vomiting, heartburn, diarrhea, constipation, abdominal pain, hematochezia or melena  Genitourinary: no urinary urgency, frequency, dysuria, nocturia, hesitancy, or incontinence  Musculoskeletal: no arthritis, arthralgia, myalgia, weakness, or morning stiffness  Skin: no abnormal pigmentation, rash, itching, masses, hair or nail changes    Outpatient Medications Marked as Taking for the 7/29/20 encounter (Office Visit) with Benjamín Carter MD   Medication Sig Dispense Refill    Insulin Pen Needle (B-D ULTRAFINE III SHORT PEN) 31G X 8 MM MISC 1 each by Does not apply route 2 times daily 200 each 3    Lancets MISC [employer] presto/tests twice daily 200 each 3    blood glucose monitor strips Test 2 times a day & as needed. Dispense sufficient amount for indicated testing frequency plus additional for as needed testing 200 strip 3    gabapentin (NEURONTIN) 100 MG capsule Take 1 capsule by mouth 3 times daily for 90 days. Intended supply: 30 days 270 capsule 0    pravastatin (PRAVACHOL) 40 MG tablet Take 1 tablet by mouth daily 90 tablet 0    metFORMIN (GLUCOPHAGE) 1000 MG tablet Take 1 tablet by mouth 2 times daily (with meals) 180 tablet 0    lisinopril (PRINIVIL;ZESTRIL) 5 MG tablet Take 1 tablet by mouth daily 90 tablet 0    insulin glargine (LANTUS SOLOSTAR) 100 UNIT/ML injection pen Inject 50 Units into the skin nightly LOT # [id number], EXP 5/31/22 10 pen 0       I have reviewed all pertinent PMHx, PSHx, FamHx, SocialHx, medications, and allergies and updated history as appropriate. OBJECTIVE:    VS: /76   Pulse 73   Temp 97.3 °F (36.3 °C)   Resp 16   Ht 5' (1.524 m)   Wt 136 lb (61.7 kg)   LMP 07/19/2020   BMI 26.56 kg/m²   General appearance: Alert, Awake, Oriented times 3, no distress  HENT: + L sided anterior cervical lymphadenopathy, regular boarders, soft, movable, non-tender on palpation measuring about 1.5 x 1.5 cm   NO palpable LN at the axillary, inguinal, R cervical area  Breast exam: unremarkable   Lungs: Lungs clear to auscultation bilaterally. No rhonchi, crackles or wheezes  Heart: S1 S2  Regular rate and rhythm. No rub, murmur or gallop  Abdomen: Abdomen soft but obese, non-tender.  non-distended BS normal. No masses, organomegaly, no guarding rebound or rigidity. Extremities: No edema, Peripheral pulses palpable 2/4    ASSESSMENT/PLAN:    Anterior Cervical LAD, measuring about 1.5 cm x 1.5 cm this clinic visit   -negative TB symptoms, no weight loss, denies smoking hx  - started about 6 months ago  - 2/2 ? Dental abscess  - refused further imaging d/t cost  - US 6/11/2020--   Underlying the palpable lump in the left neck, there is a cervical    lymph node which is normal in size and appearance. It measures 0.6 cm    in short axis. - given unknown ATB for dental pain, unable to finish, no change in the size of LN  >> will refer to General surgery for possible excision    ?  Dental abscess  - seen by outside dentist recently, prescribed ATB ( unknown) was not able to finish, advised to have procedure but unable to do it due to cost  >> refer to [address] --> scheduled next Tues, advised patient to bring records from prior dentist      T2DM  Checks BS 2x/day   Fasting glucose: 150s mg/dL   Premeals: 110-150s  Metformin 1 gram bid + Lantus 50 every morning  Pravachol 40 daily  LDL 61 mg/dL  -> hba1c 7/2020 8.3, patient starts that she is starting work   >> no medication changes for now   >> 15 pens needles samples given to patient        HTN  BP today 128/76   On lisinopril 5 mg daily      RTC: 3 months with PCP, for BS check and fu with cervical LN      I have reviewed my findings and recommendations with Henri Dupree and Dr. Suman Rodriguez MD PGY-3   7/29/2020 11:09 AM

## 2020-07-29 NOTE — PROGRESS NOTES
I saw and examined the patient with Dr. Stephanie Sharp. Patient here for follow of anterior cervical lymphadenopathy. Patient has been evaluated by dentist and was started on an anabiotic. She is unsure of the name but she did not complete the medication. She is currently on lantus for her blood sugars. She states that she had the US of her neck back in may but cost her $1000. She cannot afford this. Past medical, surgical, family history reviewed. Medications and allergies reviewed. Exam as per resident exam which reflects my own exam.    Vitals:    07/29/20 1001   BP: 128/76   Pulse: 73   Resp: 16   Temp: 97.3 °F (36.3 °C)   Weight: 136 lb (61.7 kg)   Height: 5' (1.524 m)       I reviewed patient labs. Assessment/Plan:  1. Anterior cervical lymphadenopathy  2. Diabetes Mellitus Type II      Will send to general surgery for possible biopsy. Follow up with Dentistry for the tooth. Agree with assessment and plan. See their note for further details.     Kayla Batres, DO

## 2020-07-29 NOTE — PATIENT INSTRUCTIONS
1 PM: LANTUS 50 units + 1 tablet of metformin    8 PM: Metformin       Follow-up with general surgery referral

## 2020-08-15 ENCOUNTER — HOSPITAL ENCOUNTER (OUTPATIENT)
Dept: ULTRASOUND IMAGING | Age: 45
Discharge: HOME | End: 2020-08-17

## 2020-08-15 PROCEDURE — 76999 ECHO EXAMINATION PROCEDURE: CPT

## 2020-10-23 ENCOUNTER — OFFICE VISIT (OUTPATIENT)
Dept: INTERNAL MEDICINE | Age: 45
End: 2020-10-23

## 2020-10-23 VITALS
TEMPERATURE: 98.1 F | HEIGHT: 60 IN | DIASTOLIC BLOOD PRESSURE: 76 MMHG | BODY MASS INDEX: 27.29 KG/M2 | SYSTOLIC BLOOD PRESSURE: 131 MMHG | HEART RATE: 65 BPM | RESPIRATION RATE: 16 BRPM | WEIGHT: 139 LBS

## 2020-10-23 LAB
CONTROL: NORMAL
HBA1C MFR BLD: 6.6 %
PREGNANCY TEST URINE, POC: NEGATIVE

## 2020-10-23 PROCEDURE — 81025 URINE PREGNANCY TEST: CPT | Performed by: INTERNAL MEDICINE

## 2020-10-23 PROCEDURE — G0008 ADMIN INFLUENZA VIRUS VAC: HCPCS

## 2020-10-23 PROCEDURE — 83036 HEMOGLOBIN GLYCOSYLATED A1C: CPT | Performed by: INTERNAL MEDICINE

## 2020-10-23 PROCEDURE — 99213 OFFICE O/P EST LOW 20 MIN: CPT | Performed by: INTERNAL MEDICINE

## 2020-10-23 PROCEDURE — 99203 OFFICE O/P NEW LOW 30 MIN: CPT | Performed by: INTERNAL MEDICINE

## 2020-10-23 PROCEDURE — 6360000002 HC RX W HCPCS

## 2020-10-23 PROCEDURE — 90686 IIV4 VACC NO PRSV 0.5 ML IM: CPT

## 2020-10-23 RX ORDER — GLUCOSAMINE HCL/CHONDROITIN SU 500-400 MG
CAPSULE ORAL
Qty: 200 STRIP | Refills: 3 | Status: SHIPPED | OUTPATIENT
Start: 2020-10-23

## 2020-10-23 RX ORDER — PEN NEEDLE, DIABETIC 31 GX5/16"
1 NEEDLE, DISPOSABLE MISCELLANEOUS 2 TIMES DAILY
Qty: 200 EACH | Refills: 3 | Status: SHIPPED | OUTPATIENT
Start: 2020-10-23

## 2020-10-23 RX ORDER — LANCETS 30 GAUGE
EACH MISCELLANEOUS
Qty: 200 EACH | Refills: 3 | Status: SHIPPED | OUTPATIENT
Start: 2020-10-23

## 2020-10-23 RX ORDER — PSYLLIUM HUSK/CALCIUM CARB 1 G-60 MG
1 CAPSULE ORAL 2 TIMES DAILY PRN
Qty: 60 CAPSULE | Refills: 2 | Status: SHIPPED
Start: 2020-10-23 | End: 2020-10-23 | Stop reason: SDUPTHER

## 2020-10-23 RX ORDER — INSULIN GLARGINE 100 [IU]/ML
40 INJECTION, SOLUTION SUBCUTANEOUS NIGHTLY
Qty: 8 PEN | Refills: 0 | COMMUNITY
Start: 2020-10-23 | End: 2021-01-26

## 2020-10-23 RX ORDER — PSYLLIUM HUSK/CALCIUM CARB 1 G-60 MG
1 CAPSULE ORAL 2 TIMES DAILY PRN
Qty: 60 CAPSULE | Refills: 2 | Status: SHIPPED | OUTPATIENT
Start: 2020-10-23

## 2020-10-23 ASSESSMENT — ENCOUNTER SYMPTOMS
COUGH: 0
WHEEZING: 0
ABDOMINAL PAIN: 1
SHORTNESS OF BREATH: 0
DIARRHEA: 0
CONSTIPATION: 1
VOMITING: 0
NAUSEA: 0

## 2020-10-23 NOTE — PATIENT INSTRUCTIONS
Thank you for coming to your appointment today. Please make sure to do the following:  - Get labs drawn  - Schedule your appointment in 2 months  - Continue the medications as listed    Referrals have been made to general surgery: If you do not hear from the office in 1 week, please call the number listed. If your symptoms worsen or do not improve, call for a sooner appointment or call [phone] for the nurse's line.     Pascual Keenan MD

## 2020-10-23 NOTE — PROGRESS NOTES
[id number] used to SOAP patient. Discharge instructions reviewed with patient per  [id number]. Follow up appt scheduled and AVS given to patient.      [facility] with [facility], pt notified of date and time    Basaglar given to pt 8 pens  Lot #[id number] EXP 3/21 - 3 pens  Lot #[id number] EXP 11/2020 - 5 pens    Glucose supplies script mailed to patient per pt request

## 2020-10-23 NOTE — PROGRESS NOTES
10/23/2020     Collette Simmons (:  1975) is a 39 y.o. female, here for evaluation of the following medical concerns:    HPI  PMHx of IDDM type 2 with neuropathy, HTN and cervical LAP. DM: sugars controlled, measures twice a day, 85 -167, average 110, lantus 50 units at 2 pm, weight stable, will decrease insulin. Cervical LAP: still no biopsy. Will refer. Lower abdominal pain and early satiety, 3 months no period, lower abdominal pain and constipation once a day but very hard stool. [facility] ngative, refer back to GYN clinic for menopause. Wants to wait for eye exam and foot exam until her new job's insurance kick in in 3 months. Review of Systems   Constitutional: Negative for activity change, appetite change, chills and fever. HENT: Negative for ear pain and hearing loss. Respiratory: Negative for cough, shortness of breath and wheezing. Cardiovascular: Negative for chest pain, palpitations and leg swelling. Gastrointestinal: Positive for abdominal pain (lower) and constipation. Negative for diarrhea, nausea and vomiting. Genitourinary: Negative for dysuria and frequency. Musculoskeletal: Negative for myalgias. Skin: Negative for rash. Neurological: Negative for dizziness, weakness and headaches. Psychiatric/Behavioral: Negative for agitation, behavioral problems and confusion. Prior to Visit Medications    Medication Sig Taking? Authorizing Provider   insulin glargine (BASAGLAR KWIKPEN) 100 UNIT/ML injection pen Inject 40 Units into the skin nightly Lot# [id number] EXP 3/21 3 pens  Lot#[id number] EXP  5 pens Yes Crow Luong MD   Psyllium-Calcium (METAMUCIL PLUS CALCIUM) CAPS Take 1 capsule by mouth 2 times daily as needed (constipation) Take with 8 oz water. Yes Crow Luong MD   Lancets MISC Agamatrix presto/tests twice daily Yes Crow Luong MD   blood glucose monitor strips Test 2 times a day & as needed.   Dispense sufficient amount for indicated testing frequency plus additional for as needed testing Yes Crow Luong MD   Insulin Pen Needle (B-D ULTRAFINE III SHORT PEN) 31G X 8 MM MISC 1 each by Does not apply route 2 times daily Yes Crow Luong MD   insulin glargine (LANTUS SOLOSTAR) 100 UNIT/ML injection pen Inject 50 Units into the skin nightly Lot #[id number]  Exp 5/31/22 Yes Mynor Leigh MD   gabapentin (NEURONTIN) 100 MG capsule Take 1 capsule by mouth 3 times daily for 90 days. Intended supply: 30 days Yes Jes Avendano MD   pravastatin (PRAVACHOL) 40 MG tablet Take 1 tablet by mouth daily Yes Jes Avendano MD   metFORMIN (GLUCOPHAGE) 1000 MG tablet Take 1 tablet by mouth 2 times daily (with meals) Yes Jes Avendano MD   lisinopril (PRINIVIL;ZESTRIL) 5 MG tablet Take 1 tablet by mouth daily Yes Jes Avendano MD   blood glucose monitor strips Agamatrix presto/tests twice daily  Jes Avendano MD   Blood Glucose Monitoring Suppl (AGAMATRIX AMP) DIANA 1 agamatrix glucometer. Raphael Gotti MD   Blood Glucose Monitoring Suppl DIANA Agamatrix presto/tests twice daily  Pop Dale MD        Social History     Tobacco Use    Smoking status: Never Smoker    Smokeless tobacco: Never Used   Substance Use Topics    Alcohol use: No        Vitals:    10/23/20 0911   BP: 131/76   Pulse: 65   Resp: 16   Temp: 98.1 °F (36.7 °C)   TempSrc: Oral   Weight: 139 lb (63 kg)   Height: 5' (1.524 m)     Estimated body mass index is 27.15 kg/m² as calculated from the following:    Height as of this encounter: 5' (1.524 m). Weight as of this encounter: 139 lb (63 kg). Physical Exam    ASSESSMENT/PLAN:  1. Cervical lymphadenopathy    - [facility]    2. Type 2 diabetes mellitus with diabetic polyneuropathy, without long-term current use of insulin (HCC)    - HEMOGLOBIN A1C; Future  - insulin glargine (BASAGLAR KWIKPEN) 100 UNIT/ML injection pen;  Inject 40 Units into the skin nightly Lot# [id number] EXP 3/21 3 pens  Lot#[id number] EXP 11/20 5 pens  Dispense: 8 pen; Refill: 0  - POCT glycosylated hemoglobin (Hb A1C)    3. Essential hypertension    - BASIC METABOLIC PANEL; Future    4. Constipation, unspecified constipation type    - Psyllium-Calcium (METAMUCIL PLUS CALCIUM) CAPS; Take 1 capsule by mouth 2 times daily as needed (constipation) Take with 8 oz water. Dispense: 60 capsule; Refill: 2    5. Amenorrhea    - POCT urine pregnancy    6. Needs flu shot    - INFLUENZA, QUADV, 3 YRS AND OLDER, IM PF, PREFILL SYR OR SDV, 0.5ML (AFLURIA QUADV, PF)    7. Type 2 diabetes mellitus without complication, with long-term current use of insulin ([address]    - Lancets MISC; Agamatrix presto/tests twice daily  Dispense: 200 each; Refill: 3  - blood glucose monitor strips; Test 2 times a day & as needed. Dispense sufficient amount for indicated testing frequency plus additional for as needed testing  Dispense: 200 strip; Refill: 3  - Insulin Pen Needle (B-D ULTRAFINE III SHORT PEN) 31G X 8 MM MISC; 1 each by Does not apply route 2 times daily  Dispense: 200 each; Refill: 3      Anterior Cervical LAD, measuring about 1.5 cm x 1.5 cm this clinic visit   -negative TB symptoms, no weight loss, denies smoking hx  - started about 6 months ago  - 2/2 ? Dental abscess  - refused further imaging d/t cost  - US 6/11/2020--   Underlying the palpable lump in the left neck, there is a cervical    lymph node which is normal in size and appearance. It measures 0.6 cm    in short axis. - given unknown ATB for dental pain, unable to finish, no change in the size of LN  >> will refer to General surgery again for possible excision     ?  Dental abscess  - seen by outside dentist recently, prescribed ATB ( unknown) was not able to finish, advised to have procedure but unable to do it due to cost    T2DM  Checks BS 2x/day   Fasting glucose: 150s mg/dL   Premeals: 90-150s  Metformin 1 gram bid + decrease Lantus 40 every morning  Pravachol 40 daily  LDL 61 mg/dL       Return in about 2 months (around 12/23/2020) for PCP visit, Medication Refill, BP check, Glucose Check, Lab work. An electronic signature was used to authenticate this note.     --Robyn Cordero MD on 10/23/2020 at 6:11 PM

## 2020-10-23 NOTE — PROGRESS NOTES
[facility]  Internal Medicine Clinic     Attending Physician Statement  I have discussed the case, including pertinent history and exam findings with the resident. I have seen and examined the patient and the key elements of the encounter have been performed by me. I agree with the assessment, plan and orders as documented by the resident. I have reviewed all pertinent PMHx, PSHx, FamHx, SocialHx, medications, and allergies and updated history as appropriate. Patient here for routine follow up of medical problems. 1. DM, insulin-requiring. Diet improved and with weight loss. No hypoglycemic symptoms, lowest BS in 80s. a1c 6.6 today. Decrease lantus to 40 units at night, continue monitor BS, monitor for hypoglycemic patients. Ophthalmology and podiatry consult in Jan (when new insurance is effective)  2. Essential HTN, controlled. Repeat lipid panel, BMP. 3. HLD, on pravastatin (unable to tolerate high intensity statin)  4. Persistent anterior LN. She is having symptoms to satiety. Discussed to keep [address] [place] [address] appointment for possible biopsy. 5. Irregular menses, no menses in 3 months. Pap smear last year was normal. Pregnancy test now then Gyn referral.    Flu vaccine today. Remainder of medical problems per resident's note. Kervin Mercado MD  10/23/2020 9:52 AM

## 2020-12-28 ENCOUNTER — SOCIAL WORK (OUTPATIENT)
Dept: INTERNAL MEDICINE | Age: 45
End: 2020-12-28

## 2020-12-28 ENCOUNTER — OFFICE VISIT (OUTPATIENT)
Dept: INTERNAL MEDICINE | Age: 45
End: 2020-12-28

## 2020-12-28 VITALS
DIASTOLIC BLOOD PRESSURE: 89 MMHG | HEIGHT: 60 IN | BODY MASS INDEX: 26.5 KG/M2 | TEMPERATURE: 97.1 F | WEIGHT: 135 LBS | RESPIRATION RATE: 16 BRPM | HEART RATE: 65 BPM | SYSTOLIC BLOOD PRESSURE: 141 MMHG

## 2020-12-28 PROBLEM — Z79.4 TYPE 2 DIABETES MELLITUS WITH DIABETIC NEUROPATHY, WITH LONG-TERM CURRENT USE OF INSULIN (HCC): Status: ACTIVE | Noted: 2020-12-28

## 2020-12-28 PROBLEM — E11.40 TYPE 2 DIABETES MELLITUS WITH DIABETIC NEUROPATHY, WITH LONG-TERM CURRENT USE OF INSULIN (HCC): Status: ACTIVE | Noted: 2020-12-28

## 2020-12-28 PROBLEM — E11.42 TYPE 2 DIABETES MELLITUS WITH DIABETIC POLYNEUROPATHY, WITHOUT LONG-TERM CURRENT USE OF INSULIN (HCC): Status: RESOLVED | Noted: 2017-07-31 | Resolved: 2020-12-28

## 2020-12-28 PROCEDURE — 99213 OFFICE O/P EST LOW 20 MIN: CPT | Performed by: INTERNAL MEDICINE

## 2020-12-28 PROCEDURE — 99212 OFFICE O/P EST SF 10 MIN: CPT | Performed by: INTERNAL MEDICINE

## 2020-12-28 RX ORDER — LISINOPRIL 5 MG/1
5 TABLET ORAL DAILY
Qty: 90 TABLET | Refills: 0 | Status: SHIPPED | OUTPATIENT
Start: 2020-12-28

## 2020-12-28 RX ORDER — INSULIN GLARGINE 100 [IU]/ML
40 INJECTION, SOLUTION SUBCUTANEOUS NIGHTLY
Qty: 5 PEN | Refills: 0 | COMMUNITY
Start: 2020-12-28 | End: 2021-02-03

## 2020-12-28 RX ORDER — PRAVASTATIN SODIUM 40 MG
40 TABLET ORAL DAILY
Qty: 90 TABLET | Refills: 0 | Status: SHIPPED | OUTPATIENT
Start: 2020-12-28

## 2020-12-28 NOTE — PATIENT INSTRUCTIONS
Thank you for coming to your appointment today. Please make sure to do the following:  - Schedule your appointment  - Continue the medications as listed    Referrals have been made to General Surgery: If you do not hear from the office in 1 week, please call the number listed. If your symptoms worsen or do not improve, call for a sooner appointment or call [phone] for the nurse's line.     Tommye Homans MD
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
## 2020-12-28 NOTE — PROGRESS NOTES
Discharge instructions reviewed with patient per . Follow up appt not scheduled due to PCP schedule not being available and AVS given to patient.

## 2020-12-28 NOTE — PROGRESS NOTES
Attending Physician Statement  I have discussed the case, including pertinent history and exam findings with the resident. I reviewed medical, surg, soc histories, meds and any pertinent labs. I agree with the assessment, plan and orders as documented by the resident. Hx DM on insulin, now with improved control and recent HBA1c 6.6%, HTN, controlled on ACEI, has cervical node, now > 2 cm, agree with need for biopsy.

## 2021-01-26 ENCOUNTER — OFFICE VISIT (OUTPATIENT)
Dept: INTERNAL MEDICINE | Age: 46
End: 2021-01-26

## 2021-01-26 ENCOUNTER — HOSPITAL ENCOUNTER (OUTPATIENT)
Age: 46
Discharge: HOME | End: 2021-01-26

## 2021-01-26 VITALS
DIASTOLIC BLOOD PRESSURE: 82 MMHG | RESPIRATION RATE: 12 BRPM | HEART RATE: 72 BPM | BODY MASS INDEX: 26.9 KG/M2 | HEIGHT: 60 IN | WEIGHT: 137 LBS | TEMPERATURE: 98.1 F | SYSTOLIC BLOOD PRESSURE: 120 MMHG

## 2021-01-26 DIAGNOSIS — E11.40 TYPE 2 DIABETES MELLITUS WITH DIABETIC NEUROPATHY, WITH LONG-TERM CURRENT USE OF INSULIN (HCC): ICD-10-CM

## 2021-01-26 DIAGNOSIS — E78.5 HYPERLIPIDEMIA, UNSPECIFIED HYPERLIPIDEMIA TYPE: Primary | ICD-10-CM

## 2021-01-26 DIAGNOSIS — E78.5 HYPERLIPIDEMIA, UNSPECIFIED HYPERLIPIDEMIA TYPE: ICD-10-CM

## 2021-01-26 DIAGNOSIS — R22.1 MASS OF NECK: ICD-10-CM

## 2021-01-26 DIAGNOSIS — E11.42 TYPE 2 DIABETES MELLITUS WITH DIABETIC POLYNEUROPATHY, WITHOUT LONG-TERM CURRENT USE OF INSULIN (HCC): ICD-10-CM

## 2021-01-26 DIAGNOSIS — Z79.4 TYPE 2 DIABETES MELLITUS WITH DIABETIC NEUROPATHY, WITH LONG-TERM CURRENT USE OF INSULIN (HCC): ICD-10-CM

## 2021-01-26 DIAGNOSIS — Z12.31 BREAST CANCER SCREENING BY MAMMOGRAM: ICD-10-CM

## 2021-01-26 LAB
CHOLESTEROL, TOTAL: 164 MG/DL (ref 0–199)
HBA1C MFR BLD: 6.5 % (ref 4–5.6)
HDLC SERPL-MCNC: 47 MG/DL
LDL CHOLESTEROL CALCULATED: 81 MG/DL (ref 0–99)
TRIGL SERPL-MCNC: 182 MG/DL (ref 0–149)
VLDLC SERPL CALC-MCNC: 36 MG/DL

## 2021-01-26 PROCEDURE — 99214 OFFICE O/P EST MOD 30 MIN: CPT | Performed by: INTERNAL MEDICINE

## 2021-01-26 PROCEDURE — 80061 LIPID PANEL: CPT

## 2021-01-26 PROCEDURE — 83036 HEMOGLOBIN GLYCOSYLATED A1C: CPT

## 2021-01-26 PROCEDURE — 99212 OFFICE O/P EST SF 10 MIN: CPT | Performed by: INTERNAL MEDICINE

## 2021-01-26 PROCEDURE — 36415 COLL VENOUS BLD VENIPUNCTURE: CPT

## 2021-01-26 ASSESSMENT — ENCOUNTER SYMPTOMS
DIARRHEA: 0
BACK PAIN: 0
WHEEZING: 0
CHEST TIGHTNESS: 0
ABDOMINAL PAIN: 0
COLOR CHANGE: 0
ALLERGIC/IMMUNOLOGIC NEGATIVE: 1
COUGH: 0
SHORTNESS OF BREATH: 0
ABDOMINAL DISTENTION: 0
EYES NEGATIVE: 1

## 2021-01-26 ASSESSMENT — PATIENT HEALTH QUESTIONNAIRE - PHQ9
SUM OF ALL RESPONSES TO PHQ9 QUESTIONS 1 & 2: 0
SUM OF ALL RESPONSES TO PHQ QUESTIONS 1-9: 0

## 2021-01-26 NOTE — PROGRESS NOTES
[facility]  InternalMedicine Residency Program  ACC Note      SUBJECTIVE:  CC: had concerns including Other (ck lump left side of neck). HPI:Collette Boone presented to the [facility] for a routine visit with c/o Lt neck lump x 7-8 months  Pt has IDDM type 2 with neuropathy, HTN and cervical LAP (2 x 2 cm on last visit 12/2020). She reports that the left neck lump started about 7 to 8 months ago, and was slowly getting bigger, denies any pain lump she also denies any other lumps around the area of the left neck as well as the left axillary. She denies any fever chills, any respiratory symptoms any prior history of TB infection or exposure to TB patients. Also refers that she is compliant with diabetes medication including Metformin 1000 mg twice daily and Lantus 40 units nightly. Her blood glucose level is well controlled morning glucose level is  before meals, and after meal glucose levels ranged between 190-250. However she reports some tingling and numbness of the hands bilateral and intermittent tingling of the foot. Review of Systems   Constitutional: Negative for appetite change, fatigue and unexpected weight change. HENT: Negative. With the exception of active complaint of Lt neck lump   Eyes: Negative. Respiratory: Negative for cough, chest tightness, shortness of breath and wheezing. Cardiovascular: Negative for chest pain, palpitations and leg swelling. Gastrointestinal: Negative for abdominal distention, abdominal pain and diarrhea. Endocrine: Negative. Genitourinary: Negative. Musculoskeletal: Negative for arthralgias, back pain, joint swelling, myalgias and neck stiffness. Skin: Negative for color change and pallor. Allergic/Immunologic: Negative. Neurological: Negative for dizziness, tremors, syncope, facial asymmetry, weakness and headaches.         Intermittent tingling and numbness of hands and feet Hematological: Negative. Psychiatric/Behavioral: Negative. Outpatient Medications Marked as Taking for the 1/26/21 encounter (Office Visit) with Gela Hill MD   Medication Sig Dispense Refill    pravastatin (PRAVACHOL) 40 MG tablet Take 1 tablet by mouth daily 90 tablet 0    metFORMIN (GLUCOPHAGE) 1000 MG tablet Take 1 tablet by mouth 2 times daily (with meals) 180 tablet 0    lisinopril (PRINIVIL;ZESTRIL) 5 MG tablet Take 1 tablet by mouth daily 90 tablet 0    insulin glargine (BASAGLAR KWIKPEN) 100 UNIT/ML injection pen Inject 40 Units into the skin nightly Lot# [id number] EXP 3/21 3 pens  Lot#[id number] EXP 11/20 5 pens 8 pen 0    gabapentin (NEURONTIN) 100 MG capsule Take 1 capsule by mouth 3 times daily for 90 days. Intended supply: 30 days 270 capsule 0       I have reviewed all pertinent PMHx, PSHx, FamHx, SocialHx, medications, and allergiesand updated history as appropriate. OBJECTIVE:    VS: /82   Pulse 72   Temp 98.1 °F (36.7 °C) (Oral)   Resp 12   Ht 5' (1.524 m)   Wt 137 lb (62.1 kg)   BMI 26.76 kg/m²   Physical Exam  Vitals signs reviewed. Constitutional:       Appearance: Normal appearance. HENT:      Head: Normocephalic and atraumatic. Right Ear: Tympanic membrane normal.      Left Ear: Tympanic membrane normal.      Nose: Nose normal.      Mouth/Throat:      Mouth: Mucous membranes are moist.   Eyes:      Extraocular Movements: Extraocular movements intact. Conjunctiva/sclera: Conjunctivae normal.      Pupils: Pupils are equal, round, and reactive to light. Neck:      Musculoskeletal: Normal range of motion and neck supple. No neck rigidity or muscular tenderness. Comments: Measured 1.5-2 x 1.5-2 cm, soft, mobile, (-) of  Tenderness, or erythema, or warmth  Cardiovascular:      Rate and Rhythm: Normal rate and regular rhythm. Pulses: Normal pulses. Heart sounds: Normal heart sounds. No murmur. No friction rub. No gallop.     Pulmonary: Effort: Pulmonary effort is normal. No respiratory distress. Breath sounds: Normal breath sounds. No wheezing, rhonchi or rales. Abdominal:      General: Abdomen is flat. Bowel sounds are normal. There is no distension. Palpations: Abdomen is soft. Tenderness: There is no abdominal tenderness. Musculoskeletal: Normal range of motion. General: No swelling, tenderness or deformity. Comments: (-) tinel    Skin:     General: Skin is warm. Capillary Refill: Capillary refill takes less than 2 seconds. Neurological:      Mental Status: She is alert and oriented to person, place, and time. Mental status is at baseline. Cranial Nerves: No cranial nerve deficit. Sensory: No sensory deficit. Psychiatric:         Mood and Affect: Mood normal.         Behavior: Behavior normal.         PLAN:  1. Hyperlipidemia, unspecified hyperlipidemia type  - LIPID PANEL; Future  On pravastatin    2. Mass of neck  Measured 1.5-2 x 1.5-2 cm, soft, mobile, (-) of  Tenderness, or erythema, or warmth  - [facility] CAD SCREENING; Future, with abundant precaution given the presentation of Lt neck lump  (-) of Lt cervical or axillary lump /LN palpable on PE  - US SOFT TISSUE LIMITED AREA; Future  - [facility] General Surgery      3. Type 2 diabetes mellitus with diabetic neuropathy, with long-term current use of insulin (HCC)  - HEMOGLOBIN A1C; Future  - EMG; Future  - External Referral To Podiatry  Concerning of peripheral neuropathy, EMG  BG well controlled, Continue current plan, Metformin 1000 mg twice daily and Lantus 40 units nightly, and measure BG at home     4. Breast cancer screening by mammogram  - [facility] CAD SCREENING;  Future        I have reviewed my findings and recommendations with Sarah Cruz and Dr Brooke Mcdaniel MD PGY-1   1/26/2021 10:58 AM

## 2021-01-26 NOTE — PROGRESS NOTES
Discharge instructions reviewed with patient. Patient verbalizes understanding. AVS given.   Kyrgyz interpreters # [id number] and #[id number] used for visit

## 2021-01-26 NOTE — PATIENT INSTRUCTIONS
We will contact you dates for appointment with podiatry and surgery,also for mammogram, ultrasound of neck and EMG  Get labwork before next visit,you must fast 8 HRS can have water   Electromiograma (EMG) y estudios de conducción nerviosa: Acerca de estas pruebas  Electromyogram (EMG) and Nerve Conduction Studies: About These Tests  ¿Qué son estas pruebas? Un electromiograma (EMG) mide la actividad eléctrica de los músculos cuando no los está usando (en reposo) y cuando los tensa (contracción muscular). Los estudios de [facility] (NCS, por haritha siglas en [facility]) miden con qué eficacia y rapidez los nervios pueden enviar señales eléctricas. El EMG y los estudios de conducción nerviosa a menudo se hacen juntos. Si se hacen juntos, los estudios de conducción nerviosa se hacen antes del EMG. ¿Por qué se hacen? Es posible que necesite un EMG para encontrar enfermedades que dañan los músculos o los nervios o para determinar por qué usted no puede [employer] (parálisis), por qué se sienten débiles o por qué tienen espasmos. Es posible que necesite estudios de conducción nerviosa para determinar el daño a los nervios que van desde el cerebro y la médula singleton al fermin del cuerpo (sistema nervioso periférico). Los estudios de conducción nerviosa a menudo se [place] para ayudar a encontrar trastornos nerviosos, peter el síndrome del boub[place]ar [place]. ¿Cómo puede prepararse para estas pruebas? · Informe a haritha médicos de TODOS los medicamentos, vitaminas, suplementos y carmel herbarios que christiano. Algunos medicamentos pueden afectar los [facility]. Es posible que deba dejar de roselyn algunos medicamentos antes de que le domenico esta prueba. · Si christiano aspirina o algún otro medicamento para prevenir los coágulos de [place], asegúrese de consultar con cordova médico. Le dirá si debe dejar de roselyn el medicamento antes de la prueba. Asegúrese de que comprende exactamente lo que el médico quiere que indu. · Use ropa holgada. Es posible que le den stanton bata de hospital para que se la ponga. · Los electrodos que se usan para la prueba se adhieren a cordova piel. La piel tiene que estar limpia y [place], [place], cremas y lociones. ¿Qué ocurre anai las pruebas? Se recuesta en stanton encarnacion de exploración o en stanton cama o se sienta en stanton silla reclinable para que haritha músculos estén relajados. Para un EMG:   · Cordova médico le insertará un electrodo de aguja en un músculo. [place] registrará la actividad eléctrica mientras el músculo está en reposo. Winston Daniel sienta un dolor ramon y breve cuando le introduzcan la aguja en el músculo. · Cordova médico le pedirá que contraiga merary músculo lenta y sostenidamente mientras se registra la actividad eléctrica. · Cordova médico puede  el electrodo a un lugar diferente del músculo o a un músculo diferente. Para los estudios de conducción nerviosa:   · Cordova médico le colocará dos tipos de electrodos sobre la piel. ? Un tipo de electrodo se coloca sobre un nervio y le dará un impulso eléctrico al nervio. ? El otro tipo de electrodo se coloca sobre el músculo controlado por el nervio. [place] registrará el tiempo que tarda el músculo en reaccionar al impulso eléctrico.  · Podrá sentir los impulsos eléctricos. Son choques pequeños y son seguros. ¿Qué más debería saber usted acerca de estas pruebas? · Después de un EMG, usted podría tener dolor y [place] sensación de hormigueo en los músculos anai hasta 2 días. Le pueden salir pequeños moretones o producirse stanton hinchazón en el sitio de la punción. · Para un EMG, se le puede pedir que firme un formulario de consentimiento. Hable con cordova médico de cualquier inquietud que [employer] necesidad de la prueba, haritha [place], cómo se hace o el significado de [place]. [address] [facility]? · Un electromiograma puede durar de 30 a 60 minutos. · Los estudios de conducción nerviosa pueden durar de 15 minutos a 1 hora o [employer]. Depende de cuántos nervios y músculos examine el médico.  ¿Qué ocurre después de estas pruebas? · Si alguna de las zonas de prueba del EMG están adoloridas:  ? Coloque hielo o stanton compresa fría sobre la chris anai 10 a 20 minutos por vez. Coloque un paño yung entre el hielo y la piel. ? [place] un analgésico (medicamento para el dolor) de venta harpal, peter acetaminofén (Tylenol), ibuprofeno (Advil, Motrin) o naproxeno (Aleve). Sea rsoemary con los medicamentos. Aleena y siga todas las instrucciones de la [place]. · Es probable que pueda volver a cordova hogar de inmediato. · Puede volver a haritha actividades habituales de inmediato. ¿Cuándo debe pedir ayuda? Preste especial atención a los cambios en cordova garry y asegúrese de comunicarse con cordova médico si:  · El dolor muscular debido al EMG empeora o tiene hinchazón, sensibilidad o pus en cualquiera de los sitios de la punción. · Tiene usted algún problema que piensa que puede ser debido a la prueba. · Tiene alguna pregunta acerca de la prueba o no ha recibido [place]. La atención de seguimiento es stanton parte clave de cordova tratamiento y seguridad. Asegúrese de hacer y acudir a todas las citas, y llame a cordova médico si está teniendo problemas. También es stanton buena idea mantener stanton lista de los medicamentos que christiano. Pregúntele a cordova médico cuándo espera [employer] de la prueba. ¿Dónde puede encontrar más información en inglés? Jaun Sers a [email or web address] e ingrese a cordova cuenta de MyChart. Salvatore Dodd P162 en el [place] \"Search Health Information\" para más información (en inglés) sobre \"Electromiograma (EMG) y estudios de conducción nerviosa: Acerca de estas pruebas. \"     Si no tiene stanton cuenta, indu timothy en el enlace \"Sign Up Now\". Revisado: 20 noviembre, 5345               XKHWXJW del contenido: 12.6  © 4530-9079 Healthwise, Incorporated. Las instrucciones de cuidado fueron adaptadas bajo licencia por [facility] ([facility]). Si usted tiene [place] afección médica o sobre estas instrucciones, siempre pregunte a cordova profesional de garry.  Healthwise, Incorporated niega toda garantía o responsabilidad por cordova uso de esta información.         r

## 2021-01-26 NOTE — PROGRESS NOTES
[facility]    Attending Physician Statement  I have discussed the case, including pertinent history and exam findings with the resident. I have seen and examined the patient and the key elements of the encounter have been performed by me. I agree with the assessment, plan and orders as documented by the resident. Remainder of medical problems as per resident note. 1. Pt presented for 7 months duration of growing lump on left upper neck that has been painless, last mammogram was more than a year ago. Denies any other associated symptoms. Exam showed non tender 1.5 cm LN in left aterior upper neck area, no sub or supraclavicular LN noted in this exam.  2. Also reported a month duration of tingling and burning sensation both hands, left worse than right. Exam showed no Tinel sign or compression sign, sensory symptoms were predominent on palm side. 3. DM - Also reviewed home BG. Recommended EMG UEs, mammogram, US of neck and surgery consult, and follow up. Total time 52 minutes.     Irineo Diaz MD  1/26/2021 9:52 AM

## 2021-01-29 ENCOUNTER — TELEPHONE (OUTPATIENT)
Dept: SURGERY | Age: 46
End: 2021-01-29

## 2021-01-29 NOTE — TELEPHONE ENCOUNTER
First attempt, unable to leave message to schedule pt with first available at any location for a mass on neck consult.   Electronically signed by Ana María Cisneros on 1/29/21 at 2:31 PM EST

## 2021-02-08 ENCOUNTER — TELEPHONE (OUTPATIENT)
Dept: SURGERY | Age: 46
End: 2021-02-08

## 2021-02-08 NOTE — TELEPHONE ENCOUNTER
Second attempt, unable to leave message (no vm set up) to schedule pt with first available at any location for a mass on neck consult.   Electronically signed by Dennys Wolf on 2/8/21 at 9:59 AM EST

## 2021-02-11 ENCOUNTER — CARE COORDINATION (OUTPATIENT)
Dept: INTERNAL MEDICINE | Age: 46
End: 2021-02-11

## 2021-02-11 ENCOUNTER — OFFICE VISIT (OUTPATIENT)
Dept: NEUROLOGY | Age: 46
End: 2021-02-11

## 2021-02-11 VITALS
TEMPERATURE: 97.7 F | HEIGHT: 60 IN | SYSTOLIC BLOOD PRESSURE: 110 MMHG | DIASTOLIC BLOOD PRESSURE: 90 MMHG | BODY MASS INDEX: 26.9 KG/M2 | WEIGHT: 137 LBS

## 2021-02-11 DIAGNOSIS — R20.2 PARESTHESIAS: Primary | ICD-10-CM

## 2021-02-11 DIAGNOSIS — Z79.4 TYPE 2 DIABETES MELLITUS WITH DIABETIC NEUROPATHY, WITH LONG-TERM CURRENT USE OF INSULIN (HCC): ICD-10-CM

## 2021-02-11 DIAGNOSIS — E11.40 TYPE 2 DIABETES MELLITUS WITH DIABETIC NEUROPATHY, UNSPECIFIED WHETHER LONG TERM INSULIN USE (HCC): ICD-10-CM

## 2021-02-11 DIAGNOSIS — E11.40 TYPE 2 DIABETES MELLITUS WITH DIABETIC NEUROPATHY, WITH LONG-TERM CURRENT USE OF INSULIN (HCC): ICD-10-CM

## 2021-02-11 DIAGNOSIS — R20.8 HYPERPATHIA: ICD-10-CM

## 2021-02-11 PROCEDURE — 95911 NRV CNDJ TEST 9-10 STUDIES: CPT | Performed by: PSYCHIATRY & NEUROLOGY

## 2021-02-11 PROCEDURE — 95885 MUSC TST DONE W/NERV TST LIM: CPT | Performed by: PSYCHIATRY & NEUROLOGY

## 2021-02-11 NOTE — PROGRESS NOTES
[facility]  Electrodiagnostic Laboratory  *Accredited by the [facility] with exemplary status  [address] [facility]  Phone: [phone]  Fax: [phone]    Referring Provider: Marika Snowden MD  Primary Care Physician: Anna Marie Beach MD  Patient Name: Norma Sales  Patient YOB: 1975  Gender: female  BMI: Body mass index is 26.76 kg/m². Blood pressure (!) 110/90, temperature 97.7 °F (36.5 °C), height 5' (1.524 m), weight 137 lb (62.1 kg), not currently breastfeeding. 2/11/2021    Description of clinical problem: Insulin-dependent type II diabetes mellitus, diabetic peripheral polyneuropathy. Cc: Numbness and tingling. Pain Yes   ; Numbness/tingling  Yes; Weakness  No       Brief physical exam:   Sensory deficit No, hyperpathia ; Weakness No; Atrophy  No; Reflex abnormality Yes  Limited neurologic exam performed of the upper and lower extremity shows grossly intact 5/5 power, somewhat effort related in all muscle groups with normal motor tone. DTRs: 1+ and symmetric in the upper extremities, 1+ patellar, trace absent ankle jerks, no ankle clonus, plantar responses are flexor. Negative Tinel's test to percussion over wrists. No fasciculations or focal muscular atrophy noted. Musculoskeletal: Negative bilateral straight leg raising test, no foot drop. Sensory exam to light touch and sharp stick testing reveals hyperpathia to light touch throughout. Withdraws to sharp stick testing equally well. Denies focal subjective sensory deficits. Coordination: Grossly intact. Study Limitations:  Pain, fear of needles.     Full Name: Norma Sales Gender: Female  MRN: [id number] YOB: 1975      Visit Date: 2/11/2021 09:24  Age: 39 Years 11 Months Old  Examining Physician: Dr. Magalis Hinojosa   Referring Physician: Dr. Christo Jimenez  Technician: Mckenzie Tafoya   Height: 5 feet 0 inch  Weight: 137 lbs Notes: Hx Type 2 DM with diabetic neuropathy E11.40, Z79.4      Motor NCS      Nerve / Sites Latency Amplitude Amp. 1-2 Distance Lat Diff Velocity Temp.    ms mV % cm ms m/s °C   R Median - APB      Wrist 4.06 12.7 100 8   33.4      Elbow 7.24 11.8 92.9 16 3.18 50 33.4   L Median - APB      Wrist 3.91 12.8 100 8   32.9      Elbow 7.19 9.4 73.5 16 3.28 49 33   R Ulnar - ADM      Wrist 2.66 10.9 100 8   33.4      B. Elbow 5.42 9.7 88.4 17 2.76 62 33.4      A. Elbow 7.24 8.6 78.3 10 1.82 55 33.3   R Peroneal - EDB      Ankle 3.70 4.4 100 8   32.1      Pop fossa 10.21 3.7 83.6 31 6.51 48 32.2               Sensory NCS      Nerve / Sites Onset Lat Peak Lat PP Amp Amp. 1-2 Distance Velocity Temp.    ms ms µV % cm m/s °C   R Median - Digit II (Antidromic)      Mid Palm 1.09 1.88 43.8 100 7 64 33.1      Wrist 2.97 3.75 35.0 53.5 14 47 33.1   L Median - Digit II (Antidromic)      Mid Palm 1.20 1.77 40.1 100 7 58 33      Wrist 2.66 3.49 44.0 117 14 53 33   R Ulnar - Digit V (Antidromic)      Wrist 2.55 3.39 41.3 100 14 55 33.3   R Radial - Anatomical  (Forearm)      Forearm 1.77 2.29 39.9 100 10 56 33.5   R Superficial peroneal - Ankle      Lat leg 1.88 2.34 15.1 100 10 53 32.5                 F  Wave      Nerve F Lat M Lat F-M Lat    ms ms ms   R Median- APB 25.7 4.1 21.6   R Ulnar- ADM 25.2 2.3 22.8   L Median- APB 24.2 3.9 20.3   R Peroneal- EDB 41.0 3.8 37.2       EMG         EMG Summary Table     Spontaneous MUAP Recruitment   Muscle IA Fib PSW Fasc H.F. Amp Dur. PPP Pattern   R. first dorsal inteross Normal None None None None Normal Normal None Normal   R. Abductor pollicis brevis Normal None None None None Normal Normal None Normal       Summary of Findings:   Nerve conduction studies:   · All nerve conduction studies listed in the table above were normal in latency, amplitude and conduction velocity. Needle EMG:   · Limited needle EMG was performed using a concentric needle. ? Muscles tested, as listed in the table above demonstrated normal amplitude, duration, phases and recruitment and no active denervation signs were seen. ? Due to hyperpathia and pain intolerance, patient could not tolerate further needle EMG examination and wished to stop the test.    Diagnostic Interpretation: This study was normal.   Electrodiagnosis: NCS and limited EMG examination of the right intrinsic hand muscles (I.e., limited because of poor pain tolerance) is within normal limits and shows no electrodiagnostic or conclusive evidence of a chronic diabetic sensorimotor peripheral polyneuropathy, lumbar or cervical radiculopathy or median mononeuropathy at the wrist (I.e., carpal tunnel syndrome). Note: A small fiber neuropathy cannot be assessed by means of electrodiagnostic testing. Clinical correlation is recommended. Previous Study: None known    Technologist: SC/RB  Physician: Wilfredo Lopes MD    Nerve conduction studies and electromyography were performed according to our laboratory policies and procedures which can be provided upon request. All abnormal values are identified in the table.  Laboratory normal values can also be provided upon request.       Cc: MD Seema Kaye MD

## 2021-02-11 NOTE — CARE COORDINATION
CORINNE ORDOÑEZ using  line Tish # [id number]. Message left to see if she could meet me on 2/16/2021 at 9 am medical Ramon Elizalde will be in the [facility] and I can assist her with her glucometer which she informed Martha Chaves was not working properly.

## 2021-02-16 ENCOUNTER — TELEPHONE (OUTPATIENT)
Dept: SURGERY | Age: 46
End: 2021-02-16

## 2021-05-14 ENCOUNTER — TELEPHONE (OUTPATIENT)
Dept: INTERNAL MEDICINE | Age: 46
End: 2021-05-14

## 2021-05-14 NOTE — TELEPHONE ENCOUNTER
Initiated call to patient for renewal of diabetes supply jeffery contract. Unable to leave a message. 5/17/2021 Initiated a second call. No answer. Left a message.

## 2022-08-28 NOTE — TELEPHONE ENCOUNTER
[facility]    Pre-visit planning call to discuss patient care during COVID-19 pandemic. A. Discussed with the patient  about virtual visits through [place] isai or Doxy. me link to facilitate patient care continuity. Last office visit date: 6/17/2020  Outstanding labs/imaging: US of neck---> not yet done   Medication refill needs:Insulin       B. Result of call: Face to Face appointment maintained and patient screened negative for viral symptoms (fever, chills, body aches, soar throat, abdominal pain or diarrhea) OR exposure to a sick contact. Patient instructed to call office if viral symptoms develop prior to Face to Face appointment. C. COVID-19 screening: Patient denies viral symptoms.       Joao Braxton MD  7/28/20
PAIN SCALE 2 OF 10.

## 2022-11-18 ENCOUNTER — NURSE TRIAGE (OUTPATIENT)
Dept: OTHER | Facility: CLINIC | Age: 47
End: 2022-11-18

## 2022-11-18 NOTE — TELEPHONE ENCOUNTER
[place]    Call with [place] interpretor. Subjective: Caller states \"has diabetes. Was told [facility] is offering free insulin\"     Current Symptoms: Caller looking for information regarding possible free insulin through [facility]. Recommended disposition: Allen Barrios 0591 advice provided, patient verbalizes understanding; denies any other questions or concerns; instructed to call back for any new or worsening symptoms. Call transferred to [facility] Customer Service. This triage is a result of a call to [address] [place] [address]. Please do not respond to the triage nurse through this encounter. Any subsequent communication should be directly with the patient. Reason for Disposition   General information question, no triage required and triager able to answer question    Protocols used:  Information Only Call - No Triage-Critical access hospital

## 2023-04-03 ENCOUNTER — HOSPITAL ENCOUNTER (OUTPATIENT)
Dept: HOSPITAL 83 - RESCLI | Age: 48
Discharge: HOME | End: 2023-04-03
Attending: INTERNAL MEDICINE
Payer: COMMERCIAL

## 2023-04-03 DIAGNOSIS — Z98.890: ICD-10-CM

## 2023-04-03 DIAGNOSIS — J40: ICD-10-CM

## 2023-04-03 DIAGNOSIS — I10: ICD-10-CM

## 2023-04-03 DIAGNOSIS — M54.9: ICD-10-CM

## 2023-04-03 DIAGNOSIS — E55.9: ICD-10-CM

## 2023-04-03 DIAGNOSIS — Z79.4: ICD-10-CM

## 2023-04-03 DIAGNOSIS — Z79.899: ICD-10-CM

## 2023-04-03 DIAGNOSIS — R74.01: ICD-10-CM

## 2023-04-03 DIAGNOSIS — E11.69: Primary | ICD-10-CM

## 2023-04-03 DIAGNOSIS — M48.04: ICD-10-CM

## 2023-04-03 DIAGNOSIS — E78.5: ICD-10-CM

## 2023-04-03 DIAGNOSIS — M47.814: ICD-10-CM

## 2023-04-10 ENCOUNTER — HOSPITAL ENCOUNTER (OUTPATIENT)
Dept: HOSPITAL 83 - MAMMO | Age: 48
Discharge: HOME | End: 2023-04-10
Payer: COMMERCIAL

## 2023-04-10 DIAGNOSIS — Z12.31: Primary | ICD-10-CM

## 2023-04-10 DIAGNOSIS — N64.9: ICD-10-CM

## 2023-05-01 ENCOUNTER — HOSPITAL ENCOUNTER (OUTPATIENT)
Dept: HOSPITAL 83 - US | Age: 48
Discharge: HOME | End: 2023-05-01
Payer: COMMERCIAL

## 2023-05-01 DIAGNOSIS — K76.0: Primary | ICD-10-CM

## 2024-04-15 ENCOUNTER — HOSPITAL ENCOUNTER (OUTPATIENT)
Dept: HOSPITAL 83 - RESCLI | Age: 49
Discharge: HOME | End: 2024-04-15
Attending: STUDENT IN AN ORGANIZED HEALTH CARE EDUCATION/TRAINING PROGRAM
Payer: COMMERCIAL

## 2024-04-15 DIAGNOSIS — Z53.9: Primary | ICD-10-CM

## 2024-06-10 ENCOUNTER — HOSPITAL ENCOUNTER (OUTPATIENT)
Dept: HOSPITAL 83 - RESCLI | Age: 49
Discharge: HOME | End: 2024-06-10
Attending: STUDENT IN AN ORGANIZED HEALTH CARE EDUCATION/TRAINING PROGRAM
Payer: COMMERCIAL

## 2024-06-10 DIAGNOSIS — R05.3: ICD-10-CM

## 2024-06-10 DIAGNOSIS — E55.9: ICD-10-CM

## 2024-06-10 DIAGNOSIS — Z88.8: ICD-10-CM

## 2024-06-10 DIAGNOSIS — Z98.890: ICD-10-CM

## 2024-06-10 DIAGNOSIS — E11.69: ICD-10-CM

## 2024-06-10 DIAGNOSIS — R19.4: Primary | ICD-10-CM

## 2024-06-10 DIAGNOSIS — Z79.899: ICD-10-CM

## 2024-06-10 DIAGNOSIS — M25.511: ICD-10-CM

## 2025-06-06 ENCOUNTER — TELEPHONE (OUTPATIENT)
Age: 50
End: 2025-06-06

## 2025-06-06 NOTE — TELEPHONE ENCOUNTER
Patients family contacted our community health worker (Crista) to inform us that patient is now in [facility].     Patient last seen by our office in 2021 and is no longer under our practices care. I did call the skilled nursing and discussed with the RN to inform them that this patient historically has required insulin therapy and that she will need routine medical care.     senior care RN voiced understanding.    Electronically signed by Jake Lambert DO on 6/6/2025 at 1:29 PM